# Patient Record
Sex: MALE | Employment: UNEMPLOYED | URBAN - METROPOLITAN AREA
[De-identification: names, ages, dates, MRNs, and addresses within clinical notes are randomized per-mention and may not be internally consistent; named-entity substitution may affect disease eponyms.]

---

## 2019-12-06 ENCOUNTER — HOSPITAL ENCOUNTER (INPATIENT)
Age: 49
LOS: 5 days | Discharge: HOME | DRG: 463 | End: 2019-12-11
Attending: EMERGENCY MEDICINE | Admitting: FAMILY MEDICINE
Payer: MEDICAID

## 2019-12-06 DIAGNOSIS — N12 PYELONEPHRITIS: Primary | ICD-10-CM

## 2019-12-06 DIAGNOSIS — N15.1 PERINEPHRIC ABSCESS: ICD-10-CM

## 2019-12-06 LAB
APPEARANCE UR: CLEAR
BACTERIA URNS QL MICRO: NEGATIVE /HPF
BILIRUB UR QL CFM: POSITIVE
COLOR UR: ABNORMAL
EPITH CASTS URNS QL MICRO: ABNORMAL /LPF
GLUCOSE UR STRIP.AUTO-MCNC: NEGATIVE MG/DL
HGB UR QL STRIP: NEGATIVE
KETONES UR QL STRIP.AUTO: 15 MG/DL
LEUKOCYTE ESTERASE UR QL STRIP.AUTO: NEGATIVE
NITRITE UR QL STRIP.AUTO: NEGATIVE
PH UR STRIP: 6 [PH] (ref 5–8)
PROT UR STRIP-MCNC: ABNORMAL MG/DL
RBC #/AREA URNS HPF: ABNORMAL /HPF (ref 0–5)
SP GR UR REFRACTOMETRY: 1.02 (ref 1–1.03)
UR CULT HOLD, URHOLD: NORMAL
UROBILINOGEN UR QL STRIP.AUTO: 1 EU/DL (ref 0.2–1)
WBC URNS QL MICRO: ABNORMAL /HPF (ref 0–4)

## 2019-12-06 PROCEDURE — 74011250636 HC RX REV CODE- 250/636: Performed by: FAMILY MEDICINE

## 2019-12-06 PROCEDURE — 99284 EMERGENCY DEPT VISIT MOD MDM: CPT

## 2019-12-06 PROCEDURE — 65660000001 HC RM ICU INTERMED STEPDOWN

## 2019-12-06 PROCEDURE — 74011250636 HC RX REV CODE- 250/636: Performed by: EMERGENCY MEDICINE

## 2019-12-06 PROCEDURE — 96374 THER/PROPH/DIAG INJ IV PUSH: CPT

## 2019-12-06 PROCEDURE — 74011000258 HC RX REV CODE- 258: Performed by: FAMILY MEDICINE

## 2019-12-06 PROCEDURE — 74011250637 HC RX REV CODE- 250/637: Performed by: FAMILY MEDICINE

## 2019-12-06 PROCEDURE — 87086 URINE CULTURE/COLONY COUNT: CPT

## 2019-12-06 PROCEDURE — 77030011943

## 2019-12-06 PROCEDURE — 81001 URINALYSIS AUTO W/SCOPE: CPT

## 2019-12-06 RX ORDER — ACETAMINOPHEN 325 MG/1
650 TABLET ORAL
Status: DISCONTINUED | OUTPATIENT
Start: 2019-12-06 | End: 2019-12-11 | Stop reason: HOSPADM

## 2019-12-06 RX ORDER — VANCOMYCIN HYDROCHLORIDE
1250 EVERY 8 HOURS
Status: DISCONTINUED | OUTPATIENT
Start: 2019-12-07 | End: 2019-12-09 | Stop reason: ALTCHOICE

## 2019-12-06 RX ORDER — SODIUM CHLORIDE 0.9 % (FLUSH) 0.9 %
5-40 SYRINGE (ML) INJECTION AS NEEDED
Status: DISCONTINUED | OUTPATIENT
Start: 2019-12-06 | End: 2019-12-11 | Stop reason: HOSPADM

## 2019-12-06 RX ORDER — MORPHINE SULFATE 2 MG/ML
4 INJECTION, SOLUTION INTRAMUSCULAR; INTRAVENOUS ONCE
Status: COMPLETED | OUTPATIENT
Start: 2019-12-06 | End: 2019-12-06

## 2019-12-06 RX ORDER — HEPARIN SODIUM 5000 [USP'U]/ML
5000 INJECTION, SOLUTION INTRAVENOUS; SUBCUTANEOUS EVERY 8 HOURS
Status: DISCONTINUED | OUTPATIENT
Start: 2019-12-06 | End: 2019-12-11 | Stop reason: HOSPADM

## 2019-12-06 RX ORDER — SODIUM CHLORIDE 9 MG/ML
100 INJECTION, SOLUTION INTRAVENOUS CONTINUOUS
Status: DISCONTINUED | OUTPATIENT
Start: 2019-12-06 | End: 2019-12-07 | Stop reason: ALTCHOICE

## 2019-12-06 RX ORDER — SODIUM CHLORIDE 0.9 % (FLUSH) 0.9 %
5-40 SYRINGE (ML) INJECTION EVERY 8 HOURS
Status: DISCONTINUED | OUTPATIENT
Start: 2019-12-06 | End: 2019-12-11 | Stop reason: HOSPADM

## 2019-12-06 RX ORDER — OXYCODONE AND ACETAMINOPHEN 5; 325 MG/1; MG/1
1 TABLET ORAL
Status: DISCONTINUED | OUTPATIENT
Start: 2019-12-06 | End: 2019-12-11 | Stop reason: HOSPADM

## 2019-12-06 RX ORDER — PIPERACILLIN SODIUM, TAZOBACTAM SODIUM 3; .375 G/15ML; G/15ML
3.38 INJECTION, POWDER, LYOPHILIZED, FOR SOLUTION INTRAVENOUS ONCE
COMMUNITY
End: 2019-12-11

## 2019-12-06 RX ORDER — VANCOMYCIN 2 GRAM/500 ML IN 0.9 % SODIUM CHLORIDE INTRAVENOUS
2000 ONCE
Status: DISCONTINUED | OUTPATIENT
Start: 2019-12-06 | End: 2019-12-06

## 2019-12-06 RX ADMIN — Medication 10 ML: at 21:55

## 2019-12-06 RX ADMIN — HEPARIN SODIUM 5000 UNITS: 5000 INJECTION INTRAVENOUS; SUBCUTANEOUS at 22:25

## 2019-12-06 RX ADMIN — MORPHINE SULFATE 4 MG: 2 INJECTION, SOLUTION INTRAMUSCULAR; INTRAVENOUS at 18:05

## 2019-12-06 RX ADMIN — PIPERACILLIN SODIUM AND TAZOBACTAM SODIUM 3.38 G: 3; .375 INJECTION, POWDER, LYOPHILIZED, FOR SOLUTION INTRAVENOUS at 20:54

## 2019-12-06 RX ADMIN — SODIUM CHLORIDE 100 ML/HR: 900 INJECTION, SOLUTION INTRAVENOUS at 21:56

## 2019-12-06 RX ADMIN — ACETAMINOPHEN 650 MG: 325 TABLET ORAL at 22:24

## 2019-12-06 NOTE — ED TRIAGE NOTES
Triage note: Pt arrives via EMS as transfer from [facility] to drain left kidney abscess. Pt receiving vancomycin on arrival. Hx of self cath.

## 2019-12-06 NOTE — PROGRESS NOTES
Radiology  CT from [facility] has now been loaded in [address] [facility] and is viewable. There is a not a small less than 4 cm fluid collection in the left posterior lateral perinephric space. It does not have a well formed wall typical of a formed abscess. Available clinical info reviewed. Considering the patients history it may represent a developing abscess. There is not hydronephrosis or other obstruction demonstrated. Recommend continue IV antibiotics and if not significantly improved tomorrow we can attempt drainage under CT guidance. Better to have 12-24 hours of antibiotics on board prior to accessing as it is not well formed.

## 2019-12-06 NOTE — ED PROVIDER NOTES
Patient is a 49-year-old male transferred from outside hospital for further management of left-sided pyelonephritis and perinephric abscess. White count elevated to 25, Vanco and Zosyn administered prior to transfer. Patient arrives nontoxic-appearing, alert and oriented answering all questions. Reporting pain of left flank. Chronically straight caths and follows up with Dr. Kitty Gtz of urology. Outside hospital discussed case with Dr. Kitty Gtz who recommended [facility] transfer for admission and IR drainage. Patient denies 2 weeks of pain, no fevers, nausea, vomiting, chills. Lactic acid at outside hospital 0.4. Per old records, patient is taking Cipro for 2 weeks for left flank pain. History reviewed. No pertinent past medical history.     Past Surgical History:   Procedure Laterality Date    HX ORTHOPAEDIC Left     ORIF foot    HX UROLOGICAL  2011    bladder         Family History:   Problem Relation Age of Onset    No Known Problems Mother     Cancer Father        Social History     Socioeconomic History    Marital status: SINGLE     Spouse name: Not on file    Number of children: Not on file    Years of education: Not on file    Highest education level: Not on file   Occupational History    Not on file   Social Needs    Financial resource strain: Not on file    Food insecurity:     Worry: Not on file     Inability: Not on file    Transportation needs:     Medical: Not on file     Non-medical: Not on file   Tobacco Use    Smoking status: Current Every Day Smoker     Packs/day: 1.00     Years: 20.00     Pack years: 20.00    Smokeless tobacco: Never Used   Substance and Sexual Activity    Alcohol use: No    Drug use: No    Sexual activity: Not on file   Lifestyle    Physical activity:     Days per week: Not on file     Minutes per session: Not on file    Stress: Not on file   Relationships    Social connections:     Talks on phone: Not on file     Gets together: Not on file Attends [employer] service: Not on file     Active member of club or organization: Not on file     Attends meetings of clubs or organizations: Not on file     Relationship status: Not on file    Intimate partner violence:     Fear of current or ex partner: Not on file     Emotionally abused: Not on file     Physically abused: Not on file     Forced sexual activity: Not on file   Other Topics Concern    Not on file   Social History Narrative    Not on file         ALLERGIES: Patient has no known allergies. Review of Systems   Constitutional: Negative for chills and fever. HENT: Negative for drooling and nosebleeds. Eyes: Negative for pain and itching. Respiratory: Negative for choking and stridor. Cardiovascular: Negative for leg swelling. Gastrointestinal: Negative for abdominal pain and rectal pain. Endocrine: Negative for heat intolerance and polyphagia. Genitourinary: Positive for flank pain. Negative for enuresis and genital sores. Musculoskeletal: Negative for arthralgias and joint swelling. Skin: Negative for color change. Allergic/Immunologic: Negative for immunocompromised state. Neurological: Negative for tremors and speech difficulty. Hematological: Negative for adenopathy. Psychiatric/Behavioral: Negative for dysphoric mood and sleep disturbance. Vitals:    12/06/19 1740   BP: 123/72   Pulse: 76   Resp: 16   Temp: 98.1 °F (36.7 °C)   SpO2: 98%            Physical Exam  Vitals signs and nursing note reviewed. Constitutional:       General: He is not in acute distress. Appearance: He is well-developed. He is not ill-appearing, toxic-appearing or diaphoretic. HENT:      Head: Normocephalic. Nose: Nose normal.   Eyes:      Conjunctiva/sclera: Conjunctivae normal.   Neck:      Musculoskeletal: Normal range of motion and neck supple. Cardiovascular:      Rate and Rhythm: Normal rate and regular rhythm. Heart sounds: Normal heart sounds.    Pulmonary: Effort: Pulmonary effort is normal. No respiratory distress. Breath sounds: Normal breath sounds. Abdominal:      General: There is no distension. Palpations: Abdomen is soft. Tenderness: There is tenderness. There is left CVA tenderness. Musculoskeletal: Normal range of motion. General: No deformity. Skin:     General: Skin is warm and dry. Neurological:      Mental Status: He is alert. Coordination: Coordination normal.   Psychiatric:         Behavior: Behavior normal.          MDM  Number of Diagnoses or Management Options  Perinephric abscess:   Pyelonephritis:   Diagnosis management comments: Transferred from outside hospital for admission for pyelonephritis and IR drainage of perinephric abscess. Discussed with IR on-call Dr. Holden Miller who is unaware of patient's arrival.  Images on disc with patient, and uploaded for IR to review. They will decide a procedure tonight or tomorrow morning. He remains nontoxic, HD stable during ED stay. Pain controlled. Admitted to the hospital service for further management. Procedures      Hospitalist Perfect Serve for Admission  6:04 PM    ED Room Number: ER29/29  Patient Name and age:  Martin Bajwa 52 y.o.  male  Working Diagnosis:   1. Pyelonephritis    2. Perinephric abscess      Readmission: no  Isolation Requirements:  no  Recommended Level of Care:  telemetry  Code Status:  Full Code  Department:[facility] Adult ED - 21   Other:  Pt transfer from [address] [place] [address] ER for pyelonephritis and perinephric abscess. abx prior to transfer. Pt well appearing. Discussed with IR - Dr Narciso Gomez on call. Will decide procedure tonight or tomorrow. Patient is being admitted to the hospital.  The results of their tests and reasons for their admission have been discussed with them and/or available family. They convey agreement and understanding for the need to be admitted and for their admission diagnosis.

## 2019-12-07 ENCOUNTER — APPOINTMENT (OUTPATIENT)
Dept: CT IMAGING | Age: 49
DRG: 463 | End: 2019-12-07
Attending: INTERNAL MEDICINE
Payer: MEDICAID

## 2019-12-07 LAB
ALBUMIN SERPL-MCNC: 2.6 G/DL (ref 3.5–5)
ALBUMIN/GLOB SERPL: 0.6 {RATIO} (ref 1.1–2.2)
ALP SERPL-CCNC: 84 U/L (ref 45–117)
ALT SERPL-CCNC: 19 U/L (ref 12–78)
ANION GAP SERPL CALC-SCNC: 6 MMOL/L (ref 5–15)
AST SERPL-CCNC: 9 U/L (ref 15–37)
BASOPHILS # BLD: 0.1 K/UL (ref 0–0.1)
BASOPHILS NFR BLD: 1 % (ref 0–1)
BILIRUB DIRECT SERPL-MCNC: 0.2 MG/DL (ref 0–0.2)
BILIRUB SERPL-MCNC: 0.6 MG/DL (ref 0.2–1)
BUN SERPL-MCNC: 11 MG/DL (ref 6–20)
BUN/CREAT SERPL: 14 (ref 12–20)
CALCIUM SERPL-MCNC: 8.6 MG/DL (ref 8.5–10.1)
CHLORIDE SERPL-SCNC: 108 MMOL/L (ref 97–108)
CO2 SERPL-SCNC: 24 MMOL/L (ref 21–32)
COMMENT, HOLDF: NORMAL
CREAT SERPL-MCNC: 0.76 MG/DL (ref 0.7–1.3)
DIFFERENTIAL METHOD BLD: ABNORMAL
EOSINOPHIL # BLD: 0.2 K/UL (ref 0–0.4)
EOSINOPHIL NFR BLD: 1 % (ref 0–7)
ERYTHROCYTE [DISTWIDTH] IN BLOOD BY AUTOMATED COUNT: 11.6 % (ref 11.5–14.5)
GLOBULIN SER CALC-MCNC: 4.1 G/DL (ref 2–4)
GLUCOSE SERPL-MCNC: 83 MG/DL (ref 65–100)
HCT VFR BLD AUTO: 39.3 % (ref 36.6–50.3)
HGB BLD-MCNC: 12.4 G/DL (ref 12.1–17)
IMM GRANULOCYTES # BLD AUTO: 0.2 K/UL (ref 0–0.04)
IMM GRANULOCYTES NFR BLD AUTO: 1 % (ref 0–0.5)
LYMPHOCYTES # BLD: 3 K/UL (ref 0.8–3.5)
LYMPHOCYTES NFR BLD: 14 % (ref 12–49)
MCH RBC QN AUTO: 28.8 PG (ref 26–34)
MCHC RBC AUTO-ENTMCNC: 31.6 G/DL (ref 30–36.5)
MCV RBC AUTO: 91.4 FL (ref 80–99)
MONOCYTES # BLD: 2 K/UL (ref 0–1)
MONOCYTES NFR BLD: 9 % (ref 5–13)
NEUTS SEG # BLD: 16.5 K/UL (ref 1.8–8)
NEUTS SEG NFR BLD: 74 % (ref 32–75)
NRBC # BLD: 0 K/UL (ref 0–0.01)
NRBC BLD-RTO: 0 PER 100 WBC
PLATELET # BLD AUTO: 489 K/UL (ref 150–400)
PMV BLD AUTO: 8.7 FL (ref 8.9–12.9)
POTASSIUM SERPL-SCNC: 3.8 MMOL/L (ref 3.5–5.1)
PROT SERPL-MCNC: 6.7 G/DL (ref 6.4–8.2)
RBC # BLD AUTO: 4.3 M/UL (ref 4.1–5.7)
SAMPLES BEING HELD,HOLD: NORMAL
SODIUM SERPL-SCNC: 138 MMOL/L (ref 136–145)
WBC # BLD AUTO: 22 K/UL (ref 4.1–11.1)

## 2019-12-07 PROCEDURE — 77030003666 HC NDL SPINAL BD -A

## 2019-12-07 PROCEDURE — 0T9130Z DRAINAGE OF LEFT KIDNEY WITH DRAINAGE DEVICE, PERCUTANEOUS APPROACH: ICD-10-PCS | Performed by: RADIOLOGY

## 2019-12-07 PROCEDURE — 99152 MOD SED SAME PHYS/QHP 5/>YRS: CPT

## 2019-12-07 PROCEDURE — 74011250637 HC RX REV CODE- 250/637: Performed by: FAMILY MEDICINE

## 2019-12-07 PROCEDURE — 85025 COMPLETE CBC W/AUTO DIFF WBC: CPT

## 2019-12-07 PROCEDURE — 80048 BASIC METABOLIC PNL TOTAL CA: CPT

## 2019-12-07 PROCEDURE — 87077 CULTURE AEROBIC IDENTIFY: CPT

## 2019-12-07 PROCEDURE — 74011250637 HC RX REV CODE- 250/637: Performed by: INTERNAL MEDICINE

## 2019-12-07 PROCEDURE — C1769 GUIDE WIRE: HCPCS

## 2019-12-07 PROCEDURE — 80076 HEPATIC FUNCTION PANEL: CPT

## 2019-12-07 PROCEDURE — 87186 SC STD MICRODIL/AGAR DIL: CPT

## 2019-12-07 PROCEDURE — 87040 BLOOD CULTURE FOR BACTERIA: CPT

## 2019-12-07 PROCEDURE — 77030003462 HC NDL BIOP BRST MDT -B

## 2019-12-07 PROCEDURE — 87147 CULTURE TYPE IMMUNOLOGIC: CPT

## 2019-12-07 PROCEDURE — 77030010546 HC BG URIN DRNG URES -B

## 2019-12-07 PROCEDURE — 36415 COLL VENOUS BLD VENIPUNCTURE: CPT

## 2019-12-07 PROCEDURE — 74011250636 HC RX REV CODE- 250/636: Performed by: FAMILY MEDICINE

## 2019-12-07 PROCEDURE — 65660000001 HC RM ICU INTERMED STEPDOWN

## 2019-12-07 PROCEDURE — 77030011229 HC DIL VESL COON COOK -A

## 2019-12-07 PROCEDURE — 77030011943

## 2019-12-07 PROCEDURE — 87205 SMEAR GRAM STAIN: CPT

## 2019-12-07 PROCEDURE — 74011000258 HC RX REV CODE- 258: Performed by: FAMILY MEDICINE

## 2019-12-07 PROCEDURE — 77030014115

## 2019-12-07 PROCEDURE — 74011250636 HC RX REV CODE- 250/636: Performed by: RADIOLOGY

## 2019-12-07 PROCEDURE — 87075 CULTR BACTERIA EXCEPT BLOOD: CPT

## 2019-12-07 RX ORDER — MIDAZOLAM HYDROCHLORIDE 1 MG/ML
5 INJECTION, SOLUTION INTRAMUSCULAR; INTRAVENOUS
Status: DISCONTINUED | OUTPATIENT
Start: 2019-12-07 | End: 2019-12-07 | Stop reason: ALTCHOICE

## 2019-12-07 RX ORDER — FENTANYL CITRATE 50 UG/ML
200 INJECTION, SOLUTION INTRAMUSCULAR; INTRAVENOUS
Status: DISCONTINUED | OUTPATIENT
Start: 2019-12-07 | End: 2019-12-07 | Stop reason: ALTCHOICE

## 2019-12-07 RX ORDER — SODIUM CHLORIDE 9 MG/ML
50 INJECTION, SOLUTION INTRAVENOUS CONTINUOUS
Status: DISCONTINUED | OUTPATIENT
Start: 2019-12-07 | End: 2019-12-07 | Stop reason: ALTCHOICE

## 2019-12-07 RX ADMIN — MIDAZOLAM 2 MG: 1 INJECTION INTRAMUSCULAR; INTRAVENOUS at 12:08

## 2019-12-07 RX ADMIN — HEPARIN SODIUM 5000 UNITS: 5000 INJECTION INTRAVENOUS; SUBCUTANEOUS at 05:15

## 2019-12-07 RX ADMIN — PIPERACILLIN AND TAZOBACTAM 3.38 G: 3; .375 INJECTION, POWDER, LYOPHILIZED, FOR SOLUTION INTRAVENOUS at 13:30

## 2019-12-07 RX ADMIN — OXYCODONE HYDROCHLORIDE AND ACETAMINOPHEN 1 TABLET: 5; 325 TABLET ORAL at 04:10

## 2019-12-07 RX ADMIN — MIDAZOLAM 1 MG: 1 INJECTION INTRAMUSCULAR; INTRAVENOUS at 12:13

## 2019-12-07 RX ADMIN — FENTANYL CITRATE 50 MCG: 50 INJECTION, SOLUTION INTRAMUSCULAR; INTRAVENOUS at 12:21

## 2019-12-07 RX ADMIN — OXYCODONE HYDROCHLORIDE AND ACETAMINOPHEN 1 TABLET: 5; 325 TABLET ORAL at 08:32

## 2019-12-07 RX ADMIN — OXYCODONE HYDROCHLORIDE AND ACETAMINOPHEN 1 TABLET: 5; 325 TABLET ORAL at 20:19

## 2019-12-07 RX ADMIN — FENTANYL CITRATE 50 MCG: 50 INJECTION, SOLUTION INTRAMUSCULAR; INTRAVENOUS at 12:13

## 2019-12-07 RX ADMIN — MIDAZOLAM 1 MG: 1 INJECTION INTRAMUSCULAR; INTRAVENOUS at 12:20

## 2019-12-07 RX ADMIN — PIPERACILLIN AND TAZOBACTAM 3.38 G: 3; .375 INJECTION, POWDER, LYOPHILIZED, FOR SOLUTION INTRAVENOUS at 05:15

## 2019-12-07 RX ADMIN — MIDAZOLAM 1 MG: 1 INJECTION INTRAMUSCULAR; INTRAVENOUS at 12:16

## 2019-12-07 RX ADMIN — FENTANYL CITRATE 50 MCG: 50 INJECTION, SOLUTION INTRAMUSCULAR; INTRAVENOUS at 12:16

## 2019-12-07 RX ADMIN — FENTANYL CITRATE 50 MCG: 50 INJECTION, SOLUTION INTRAMUSCULAR; INTRAVENOUS at 12:17

## 2019-12-07 RX ADMIN — HEPARIN SODIUM 5000 UNITS: 5000 INJECTION INTRAVENOUS; SUBCUTANEOUS at 22:11

## 2019-12-07 RX ADMIN — VANCOMYCIN HYDROCHLORIDE 1250 MG: 10 INJECTION, POWDER, LYOPHILIZED, FOR SOLUTION INTRAVENOUS at 00:17

## 2019-12-07 RX ADMIN — VANCOMYCIN HYDROCHLORIDE 1250 MG: 10 INJECTION, POWDER, LYOPHILIZED, FOR SOLUTION INTRAVENOUS at 16:11

## 2019-12-07 RX ADMIN — Medication 10 ML: at 13:34

## 2019-12-07 RX ADMIN — Medication 10 ML: at 05:14

## 2019-12-07 RX ADMIN — HEPARIN SODIUM 5000 UNITS: 5000 INJECTION INTRAVENOUS; SUBCUTANEOUS at 13:30

## 2019-12-07 RX ADMIN — PIPERACILLIN AND TAZOBACTAM 3.38 G: 3; .375 INJECTION, POWDER, LYOPHILIZED, FOR SOLUTION INTRAVENOUS at 20:21

## 2019-12-07 RX ADMIN — Medication 10 ML: at 22:11

## 2019-12-07 RX ADMIN — ACETAMINOPHEN 650 MG: 325 TABLET ORAL at 16:12

## 2019-12-07 RX ADMIN — VANCOMYCIN HYDROCHLORIDE 1250 MG: 10 INJECTION, POWDER, LYOPHILIZED, FOR SOLUTION INTRAVENOUS at 08:17

## 2019-12-07 NOTE — ROUTINE PROCESS
TRANSFER - OUT REPORT: 
 
Verbal report given to [address], RN(name) on Darian Wiggins  being transferred to [facility]) for routine progression of care Report consisted of patients Situation, Background, Assessment and  
Recommendations(SBAR). Information from the following report(s) SBAR, ED Summary, [facility] and Recent Results was reviewed with the receiving nurse. Lines:  
Peripheral IV 12/06/19 Left Antecubital (Active) Site Assessment Clean, dry, & intact 12/6/2019  5:58 PM  
Phlebitis Assessment 0 12/6/2019  5:58 PM  
Infiltration Assessment 0 12/6/2019  5:58 PM  
Dressing Status Clean, dry, & intact 12/6/2019  5:58 PM  
   
Peripheral IV 12/06/19 Right Antecubital (Active) Site Assessment Clean, dry, & intact 12/6/2019  5:59 PM  
Phlebitis Assessment 0 12/6/2019  5:59 PM  
Infiltration Assessment 0 12/6/2019  5:59 PM  
Dressing Status Clean, dry, & intact 12/6/2019  5:59 PM  
  
 
Opportunity for questions and clarification was provided. Patient transported with: 
 Monitor Tech

## 2019-12-07 NOTE — H&P
[facility]  HISTORY AND PHYSICAL    Name:  May Tierney  MR#:  [id number]  :  1970  ACCOUNT #:  [de-identified]  ADMIT DATE:  2019      CHIEF COMPLAINT:  Left flank pain. HISTORY OF PRESENT ILLNESS:  The patient is a 51-year-old male transferred from [facility] with the above-mentioned symptom. The patient reports that he has history of neurogenic bladder, he self-caths himself. About two weeks back, he started noticing some left flank pain. The patient reports the pain got much worse in the last three days and he presented to [facility].  The patient reports he follows up with Dr. Stoney Martinez from Urology. The patient was seen over at [facility] [place], had a CT done, there was a concern for a perinephric abscess, and the patient was sent to [facility] ER for further management and evaluation. The patient reports that he has not had any fever or chills, but does admit to some nausea. Denies any vomiting. The patient denies any headache, blurry vision, sore throat, trouble swallowing, trouble with speech, any chest pain, shortness of breath, cough. Does admit to chills, but denies any fever, denies any constipation, diarrhea, focal or generalized neurological weakness, recent travel, sick contacts, falls, injuries, hematemesis, melena, hemoptysis, hematuria, or any concerns or problems. PAST MEDICAL HISTORY:  See above. HOME MEDICATIONS:  Currently, the patient is on Dilaudid 2 mg every 4 hours as needed for pain. ALLERGIES:  NO KNOWN DRUG ALLERGIES. SOCIAL HISTORY:  Current everyday smoker. Denies alcohol use or IV drug abuse. Lives at home. FAMILY HISTORY:  Father had a history of some unknown cancer. REVIEW OF SYMPTOMS:  All systems were reviewed and found to be essentially negative except for the symptoms mentioned above.     PHYSICAL EXAMINATION:  VITAL SIGNS:  Temperature 98.1, pulse 76, respiratory rate 16, blood pressure 123/72, pulse oximetry 98% on room air. GENERAL:  Alert and oriented x3, awake, mildly distressed, pleasant male, appears to be stated age. HEENT:  Pupils equal and reactive to light. Dry mucous membranes. Tympanic membranes clear. NECK:  Supple. CHEST:  Clear to auscultation bilaterally. CORONARY:  S1, S2 heard. ABDOMEN:  Soft, nontender, nondistended. Bowel sounds are physiological.  EXTREMITIES:  No clubbing, no cyanosis, no edema. NEURO/PSYCH:  Pleasant mood and affect. Cranial nerves II-XII are grossly intact. Sensory grossly within normal limits. DTRs 2+ x4. Strength 5/5. SKIN:  Warm. LABORATORY DATA:  White count 12.4, hemoglobin 15.9, hematocrit 46.5, platelets 083. Urine shows a large amount of leukocyte esterase with 20-50 wbc's. Negative for bacteria. Sodium 140, potassium 3.8, chloride 107, bicarbonate 37, anion gap 6, glucose 89, BUN 12, creatinine 0.93, calcium 9.2. EKG shows normal sinus rhythm and normal EKG. Urine culture is pending. ASSESSMENT AND PLAN:  1. Pyelonephritis with developing perinephric abscess. Appreciate Interventional Radiology input. We will keep the patient n.p.o. after midnight, IV hydration, IV antibiotics. Blood cultures were drawn in the emergency room. Provide supportive care, pain control, and continue to monitor. Further intervention per hospital course. Reassess as needed. If persists, may consider further intervention and diagnostics. Urine culture will be sent. 2.  Tobacco abuse. The patient was counseled. 3.  Leukocytosis, likely secondary to pyelonephritis with developing perinephric abscess. We will trend. Continue to monitor. Further intervention per hospital course. 4.  Gastrointestinal and deep venous thrombosis prophylaxis. The patient will be on heparin.       Chiquis Joe MD      [id number]  D:  12/06/2019 18:57  T:  12/06/2019 21:18  JOB #:  [id number]

## 2019-12-07 NOTE — ED NOTES
Pt received IV Zosyn at [facility] at 1400 today. Spoke to pharmacist who says give next dose at 2000.

## 2019-12-07 NOTE — PROGRESS NOTES
Problem: General Medical Care Plan  Goal: *Optimal pain control at patient's stated goal  Outcome: Progressing Towards Goal  Note:   Spoke with Dr. Macho Winn about prn pain medication for pt's left flank pain. Orders received. Goal: *Skin integrity maintained  Outcome: Progressing Towards Goal  Note:   Primary Nurse Michal Ganser, RN and Nguyen Corbett RN performed a dual skin assessment on this patient, No impairment noted  Deonte score is 23    Goal: *Fluid volume balance  Outcome: Progressing Towards Goal  Note:   IV fluids started per order. Bedside shift change report given to Kelsey Ocasio RN (oncoming nurse) by Blanco Byers RN (offgoing nurse). Report included the following information SBAR, Kardex, ED Summary, Intake/Output, MAR, Accordion, Recent Results and Cardiac Rhythm NSR.

## 2019-12-07 NOTE — PROGRESS NOTES
Admission Medication Reconciliation:    Information obtained from:  Patient    Comments/Recommendations: Reviewed PTA medications and patient's allergies. 1.  Patient was taking no medications prior to arrival at [facility] other than an antibiotic for UTI, could not remember name but was taking BID. (?cipro)    2. Medication changes (since last review): Added  - Vancomycin 2500mg x1 administered @ [facility] 402  - Zosyn 3.375g x1 administered at [facility] @ 1355    Adjusted  - none    Removed  - hydromorphone       ¹[employer] pharmacy benefit data reflects medications filled and processed through the patient's insurance, however   this data does NOT capture whether the medication was picked up or is currently being taken by the patient. Allergies:  Patient has no known allergies. Significant PMH/Disease States: History reviewed. No pertinent past medical history. Chief Complaint for this Admission:    Chief Complaint   Patient presents with    Transfer Of Care     Prior to Admission Medications:   Prior to Admission Medications   Prescriptions Last Dose Informant Patient Reported? Taking?   piperacillin-tazobactam (ZOSYN) 3.375 gram injection 2019 at 1355  Yes Yes   Sig: 3.375 g by IntraVENous route once. vancomycin HCl (VANCOMYCIN IV) 2019 at 1420  Yes Yes   Si,500 mg PE by IntraVENous route once.       Facility-Administered Medications: None

## 2019-12-07 NOTE — ROUTINE PROCESS
TRANSFER - IN REPORT: 
 
Verbal report received from Ayla Elkins RN (name) on Esther Saini  being received from ED(unit) for routine progression of care Report consisted of patients Situation, Background, Assessment and  
Recommendations(SBAR). Information from the following report(s) SBAR, Kardex, ED Summary, Intake/Output, MAR, Accordion, Recent Results and Cardiac Rhythm NSR was reviewed with the receiving nurse. Opportunity for questions and clarification was provided. Assessment completed upon patients arrival to unit and care assumed.

## 2019-12-07 NOTE — PROGRESS NOTES
Pharmacist Note - Vancomycin Dosing    Consult provided for this 52 y.o. male for indication of left-sided pyelonephritis and perinephric abscess, possible sepsis  Antibiotic regimen: Vanc and Zosyn  Patient on vancomycin PTA? YES    Scr 0.77 (collected today at [facility])  Frequency of BMP: tomorrow am  Height: 182.9 cm  Weight: 88.5 kg  Est CrCl: >100 ml/min  Temp (24hrs), Av.1 °F (36.7 °C), Min:98.1 °F (36.7 °C), Max:98.1 °F (36.7 °C)    Cultures:  none    Goal trough = 15 - 20 mcg/mL    Therapy will be initiated with a maintenance dose of 1250 mg IV every 8 hours. Patient received a LD 2500 mg IV today prior to admission at [facility] @ 14:20. Will start maintenance dose at midnight. Pharmacy to follow patient daily and order levels / make dose adjustments as appropriate.

## 2019-12-07 NOTE — PROGRESS NOTES
TRANSFER - OUT REPORT:    Verbal report given to ALEJANDRINA Arreola on Darian Wiggins  being transferred to [phone] for routine progression of care       Report consisted of patients Situation, Background, Assessment and   Recommendations(SBAR). Information from the following report(s) SBAR and Procedure Summary was reviewed with the receiving nurse. Lines:   Peripheral IV 12/06/19 Left Antecubital (Active)   Site Assessment Clean, dry, & intact 12/7/2019  8:30 AM   Phlebitis Assessment 0 12/7/2019  8:30 AM   Infiltration Assessment 0 12/7/2019  8:30 AM   Dressing Status Clean, dry, & intact 12/7/2019  8:30 AM   Dressing Type Tape;Transparent 12/7/2019  8:30 AM   Hub Color/Line Status Green;Capped 12/7/2019  8:30 AM   Action Taken Open ports on tubing capped 12/7/2019  8:30 AM   Alcohol Cap Used Yes 12/7/2019  8:30 AM       Peripheral IV 12/06/19 Right Antecubital (Active)   Site Assessment Clean, dry, & intact 12/7/2019  8:30 AM   Phlebitis Assessment 0 12/7/2019  8:30 AM   Infiltration Assessment 0 12/7/2019  8:30 AM   Dressing Status Clean, dry, & intact 12/7/2019  8:30 AM   Dressing Type Tape;Transparent 12/7/2019  8:30 AM   Hub Color/Line Status Green;Capped 12/7/2019  8:30 AM   Action Taken Open ports on tubing capped 12/7/2019  8:30 AM   Alcohol Cap Used Yes 12/7/2019  8:30 AM        Opportunity for questions and clarification was provided.       Patient transported with:   tech, O2

## 2019-12-07 NOTE — PROGRESS NOTES
[facility] Adult  Hospitalist Group                                                                                          Hospitalist Progress Note  Jennifer Streeter MD  Answering service: [phone] from in house phone        Date of Service:  2019  NAME:  Des Collins  :  1970  MRN:  [id number]      Admission Summary:   The patient is a 22-year-old male transferred from [facility] with the above-mentioned symptom. The patient reports that he has history of neurogenic bladder, he self-caths himself. About two weeks back, he started noticing some left flank pain. The patient reports the pain got much worse in the last three days and he presented to [facility].  The patient reports he follows up with Dr. Ryan Abdi from Urology. The patient was seen over at [facility] AT [place], had a CT done, there was a concern for a perinephric abscess, and the patient was sent to [facility] ER for further management and evaluation. The patient reports that he has not had any fever or chills, but does admit to some nausea. Denies any vomiting. The patient denies any headache, blurry vision, sore throat, trouble swallowing, trouble with speech, any chest pain, shortness of breath, cough. Does admit to chills, but denies any fever, denies any constipation, diarrhea, focal or generalized neurological weakness, recent travel, sick contacts, falls, injuries, hematemesis, melena, hemoptysis, hematuria, or any concerns or problems. Interval history / Subjective:   Overnight events noted. Vitals stable. No fevers. Continue to have the same pain in the left flank. Altamese Chars to know the next step and whether he is going to need any drainage. Assessment & Plan:     1.  L Pyelonephritis with developing left posterolateral perinephric abscess. Not septic per SIRS criteria. Only leukocytosis on admission  - D/w IR, Dr. Dotson Presume.  With increased leukocytosis IR will proceed with CT guided drainage today. Pus culture from IR to be sent.  -Cont NPO for now, IV hydration, IV antibiotics-Zosyn and Vanc empirically.   -Note suggests to have Blood cultures drawn in the ED. Will collect now, if not able to verify the collection in ED and sent to micro lab. -Continue supportive care, pain control, and monitor closely.    -Follow Urine culture and blood cultures.  -Will get Urology consultation - Dr. Luis A Cano is the urologist    # Neurogenic bladder, self cath dependent  -In and out cath as needed with I/O's monitoring    # Tobacco abuse.    -Patient counseled. # Leukocytosis, secondary to pyelonephritis with developing perinephric abscess.    -Uptrended. IR guided abscess drainage today    # Gastrointestinal and deep venous thrombosis prophylaxis. The patient will be on heparin. Code status: Full code  DVT prophylaxis: Heparin, hold for the procedure as needed - per IR    Care Plan discussed with: Patient/Family and Nurse  Disposition: Home w/Family and TBD     Hospital Problems  Never Reviewed          Codes Class Noted POA    Pyelonephritis ICD-10-CM: N12  ICD-9-CM: 590.80  12/6/2019 Unknown                Review of Systems:   A comprehensive review of systems was negative except for that written in the HPI. Vital Signs:    Last 24hrs VS reviewed since prior progress note. Most recent are:  Visit Vitals  /78 (BP 1 Location: Right arm, BP Patient Position: At rest)   Pulse 68   Temp 97.8 °F (36.6 °C)   Resp 22   Ht 6' (1.829 m)   Wt 87.1 kg (192 lb 0.4 oz)   SpO2 99%   BMI 26.04 kg/m²         Intake/Output Summary (Last 24 hours) at 12/7/2019 5233  Last data filed at 12/7/2019 0830  Gross per 24 hour   Intake 756.67 ml   Output 925 ml   Net -168.33 ml        Physical Examination:             Constitutional:  No acute distress, cooperative, pleasant    ENT:  Oral mucous moist, oropharynx benign. Resp:  CTA bilaterally. No wheezing/rhonchi/rales.  No accessory muscle use CV:  Regular rhythm, normal rate, no murmurs, gallops, rubs    GI:  Soft, non distended, mild tenderness left upper quadrant and lateral left flank subcostally . normoactive bowel sounds, no hepatosplenomegaly     Musculoskeletal:  No edema, warm, 2+ pulses throughout    Neurologic:  Moves all extremities. AAOx3, CN II-XII reviewed     Skin:  Good turgor, no rashes or ulcers  :  left flank and LUQ tenderness. No guarding/ rigidity. No CVA tendernes bilaterally  Eyes:  EOMI. Anicteric sclerae, PERRL. Data Review:   Review and/or order of clinical lab test  Review and/or order of tests in the radiology section of CPT  Review and/or order of tests in the medicine section of CPT      Labs:     Recent Labs     12/07/19  0401   WBC 22.0*   HGB 12.4   HCT 39.3   *     Recent Labs     12/07/19  0401      K 3.8      CO2 24   BUN 11   CREA 0.76   GLU 83   CA 8.6     No results for input(s): SGOT, GPT, ALT, AP, TBIL, TBILI, TP, ALB, GLOB, GGT, AML, LPSE in the last 72 hours. No lab exists for component: AMYP, HLPSE  No results for input(s): INR, PTP, APTT, INREXT in the last 72 hours. No results for input(s): FE, TIBC, PSAT, FERR in the last 72 hours. No results found for: FOL, RBCF   No results for input(s): PH, PCO2, PO2 in the last 72 hours. No results for input(s): CPK, CKNDX, TROIQ in the last 72 hours.     No lab exists for component: CPKMB  No results found for: CHOL, CHOLX, CHLST, CHOLV, HDL, HDLP, LDL, LDLC, DLDLP, TGLX, TRIGL, TRIGP, CHHD, CHHDX  No results found for: [facility]  Lab Results   Component Value Date/Time    Color DARK YELLOW 12/06/2019 08:39 PM    Appearance CLEAR 12/06/2019 08:39 PM    Specific gravity 1.020 12/06/2019 08:39 PM    Specific gravity 1.010 01/08/2016 06:38 AM    pH (UA) 6.0 12/06/2019 08:39 PM    Protein TRACE (A) 12/06/2019 08:39 PM    Glucose NEGATIVE  12/06/2019 08:39 PM    Ketone 15 (A) 12/06/2019 08:39 PM    Bilirubin NEGATIVE  01/08/2016 06:38 AM Urobilinogen 1.0 12/06/2019 08:39 PM    Nitrites NEGATIVE  12/06/2019 08:39 PM    Leukocyte Esterase NEGATIVE  12/06/2019 08:39 PM    Epithelial cells FEW 12/06/2019 08:39 PM    Bacteria NEGATIVE  12/06/2019 08:39 PM    WBC 0-4 12/06/2019 08:39 PM    RBC 0-5 12/06/2019 08:39 PM         Medications Reviewed:     Current Facility-Administered Medications   Medication Dose Route Frequency    sodium chloride (NS) flush 5-40 mL  5-40 mL IntraVENous Q8H    sodium chloride (NS) flush 5-40 mL  5-40 mL IntraVENous PRN    0.9% sodium chloride infusion  100 mL/hr IntraVENous CONTINUOUS    acetaminophen (TYLENOL) tablet 650 mg  650 mg Oral Q4H PRN    heparin (porcine) injection 5,000 Units  5,000 Units SubCUTAneous Q8H    piperacillin-tazobactam (ZOSYN) 3.375 g in 0.9% sodium chloride (MBP/ADV) 100 mL  3.375 g IntraVENous Q8H    vancomycin dosing by pharmacy   Other Rx Dosing/Monitoring    vancomycin (VANCOCIN) 1250 mg in  ml infusion  1,250 mg IntraVENous Q8H    oxyCODONE-acetaminophen (PERCOCET) 5-325 mg per tablet 1 Tab  1 Tab Oral Q4H PRN     ______________________________________________________________________  EXPECTED LENGTH OF STAY: - - -  ACTUAL LENGTH OF STAY:          1                 Elroy Holm MD

## 2019-12-08 LAB
ALBUMIN SERPL-MCNC: 2.6 G/DL (ref 3.5–5)
ALBUMIN/GLOB SERPL: 0.6 {RATIO} (ref 1.1–2.2)
ALP SERPL-CCNC: 77 U/L (ref 45–117)
ALT SERPL-CCNC: 21 U/L (ref 12–78)
ANION GAP SERPL CALC-SCNC: 6 MMOL/L (ref 5–15)
AST SERPL-CCNC: 14 U/L (ref 15–37)
BACTERIA SPEC CULT: NORMAL
BASOPHILS # BLD: 0.1 K/UL (ref 0–0.1)
BASOPHILS NFR BLD: 1 % (ref 0–1)
BILIRUB SERPL-MCNC: 0.5 MG/DL (ref 0.2–1)
BUN SERPL-MCNC: 6 MG/DL (ref 6–20)
BUN/CREAT SERPL: 7 (ref 12–20)
CALCIUM SERPL-MCNC: 9 MG/DL (ref 8.5–10.1)
CC UR VC: NORMAL
CHLORIDE SERPL-SCNC: 107 MMOL/L (ref 97–108)
CO2 SERPL-SCNC: 26 MMOL/L (ref 21–32)
CREAT SERPL-MCNC: 0.86 MG/DL (ref 0.7–1.3)
DATE LAST DOSE: ABNORMAL
DIFFERENTIAL METHOD BLD: ABNORMAL
EOSINOPHIL # BLD: 0.1 K/UL (ref 0–0.4)
EOSINOPHIL NFR BLD: 1 % (ref 0–7)
ERYTHROCYTE [DISTWIDTH] IN BLOOD BY AUTOMATED COUNT: 11.3 % (ref 11.5–14.5)
GLOBULIN SER CALC-MCNC: 4.4 G/DL (ref 2–4)
GLUCOSE SERPL-MCNC: 107 MG/DL (ref 65–100)
HCT VFR BLD AUTO: 41.2 % (ref 36.6–50.3)
HGB BLD-MCNC: 13.2 G/DL (ref 12.1–17)
IMM GRANULOCYTES # BLD AUTO: 0.1 K/UL (ref 0–0.04)
IMM GRANULOCYTES NFR BLD AUTO: 1 % (ref 0–0.5)
LYMPHOCYTES # BLD: 2.6 K/UL (ref 0.8–3.5)
LYMPHOCYTES NFR BLD: 14 % (ref 12–49)
MCH RBC QN AUTO: 28.9 PG (ref 26–34)
MCHC RBC AUTO-ENTMCNC: 32 G/DL (ref 30–36.5)
MCV RBC AUTO: 90.2 FL (ref 80–99)
MONOCYTES # BLD: 1.6 K/UL (ref 0–1)
MONOCYTES NFR BLD: 9 % (ref 5–13)
NEUTS SEG # BLD: 14.4 K/UL (ref 1.8–8)
NEUTS SEG NFR BLD: 74 % (ref 32–75)
NRBC # BLD: 0 K/UL (ref 0–0.01)
NRBC BLD-RTO: 0 PER 100 WBC
PLATELET # BLD AUTO: 514 K/UL (ref 150–400)
PMV BLD AUTO: 8.6 FL (ref 8.9–12.9)
POTASSIUM SERPL-SCNC: 3.6 MMOL/L (ref 3.5–5.1)
PROT SERPL-MCNC: 7 G/DL (ref 6.4–8.2)
RBC # BLD AUTO: 4.57 M/UL (ref 4.1–5.7)
REPORTED DOSE,DOSE: ABNORMAL UNITS
REPORTED DOSE/TIME,TMG: ABNORMAL
SERVICE CMNT-IMP: NORMAL
SODIUM SERPL-SCNC: 139 MMOL/L (ref 136–145)
VANCOMYCIN TROUGH SERPL-MCNC: 16.2 UG/ML (ref 5–10)
WBC # BLD AUTO: 18.9 K/UL (ref 4.1–11.1)

## 2019-12-08 PROCEDURE — 36415 COLL VENOUS BLD VENIPUNCTURE: CPT

## 2019-12-08 PROCEDURE — 80053 COMPREHEN METABOLIC PANEL: CPT

## 2019-12-08 PROCEDURE — 74011250637 HC RX REV CODE- 250/637: Performed by: INTERNAL MEDICINE

## 2019-12-08 PROCEDURE — 65660000001 HC RM ICU INTERMED STEPDOWN

## 2019-12-08 PROCEDURE — 74011250636 HC RX REV CODE- 250/636: Performed by: FAMILY MEDICINE

## 2019-12-08 PROCEDURE — 85025 COMPLETE CBC W/AUTO DIFF WBC: CPT

## 2019-12-08 PROCEDURE — 77030011943

## 2019-12-08 PROCEDURE — 80202 ASSAY OF VANCOMYCIN: CPT

## 2019-12-08 PROCEDURE — 74011000258 HC RX REV CODE- 258: Performed by: FAMILY MEDICINE

## 2019-12-08 RX ADMIN — PIPERACILLIN AND TAZOBACTAM 3.38 G: 3; .375 INJECTION, POWDER, LYOPHILIZED, FOR SOLUTION INTRAVENOUS at 20:48

## 2019-12-08 RX ADMIN — OXYCODONE HYDROCHLORIDE AND ACETAMINOPHEN 1 TABLET: 5; 325 TABLET ORAL at 15:45

## 2019-12-08 RX ADMIN — HEPARIN SODIUM 5000 UNITS: 5000 INJECTION INTRAVENOUS; SUBCUTANEOUS at 22:57

## 2019-12-08 RX ADMIN — Medication 10 ML: at 05:15

## 2019-12-08 RX ADMIN — OXYCODONE HYDROCHLORIDE AND ACETAMINOPHEN 1 TABLET: 5; 325 TABLET ORAL at 05:23

## 2019-12-08 RX ADMIN — VANCOMYCIN HYDROCHLORIDE 1250 MG: 10 INJECTION, POWDER, LYOPHILIZED, FOR SOLUTION INTRAVENOUS at 08:09

## 2019-12-08 RX ADMIN — OXYCODONE HYDROCHLORIDE AND ACETAMINOPHEN 1 TABLET: 5; 325 TABLET ORAL at 00:16

## 2019-12-08 RX ADMIN — Medication 10 ML: at 14:32

## 2019-12-08 RX ADMIN — Medication 10 ML: at 22:57

## 2019-12-08 RX ADMIN — PIPERACILLIN AND TAZOBACTAM 3.38 G: 3; .375 INJECTION, POWDER, LYOPHILIZED, FOR SOLUTION INTRAVENOUS at 05:16

## 2019-12-08 RX ADMIN — HEPARIN SODIUM 5000 UNITS: 5000 INJECTION INTRAVENOUS; SUBCUTANEOUS at 05:15

## 2019-12-08 RX ADMIN — VANCOMYCIN HYDROCHLORIDE 1250 MG: 10 INJECTION, POWDER, LYOPHILIZED, FOR SOLUTION INTRAVENOUS at 18:25

## 2019-12-08 RX ADMIN — HEPARIN SODIUM 5000 UNITS: 5000 INJECTION INTRAVENOUS; SUBCUTANEOUS at 14:31

## 2019-12-08 RX ADMIN — OXYCODONE HYDROCHLORIDE AND ACETAMINOPHEN 1 TABLET: 5; 325 TABLET ORAL at 20:45

## 2019-12-08 RX ADMIN — VANCOMYCIN HYDROCHLORIDE 1250 MG: 10 INJECTION, POWDER, LYOPHILIZED, FOR SOLUTION INTRAVENOUS at 00:34

## 2019-12-08 RX ADMIN — PIPERACILLIN AND TAZOBACTAM 3.38 G: 3; .375 INJECTION, POWDER, LYOPHILIZED, FOR SOLUTION INTRAVENOUS at 14:31

## 2019-12-08 RX ADMIN — OXYCODONE HYDROCHLORIDE AND ACETAMINOPHEN 1 TABLET: 5; 325 TABLET ORAL at 11:01

## 2019-12-08 NOTE — PROGRESS NOTES
Bedside shift change report given to Lurdes Constantino RN (oncoming nurse) by Lucilla Mcardle, RN (offgoing nurse). Report included the following information SBAR, Kardex, Intake/Output, MAR, Recent Results and Cardiac Rhythm NSR. Problem: General Medical Care Plan  Goal: *Vital signs within specified parameters  Outcome: Progressing Towards Goal  Goal: *Optimal pain control at patient's stated goal  Outcome: Progressing Towards Goal  Prn pain medication given. See MAR  Goal: *Optimize nutritional status  Outcome: Progressing Towards Goal   pt on regular diet. Did not eat much of dinner- sleepy post procedure.    Problem: General Infection Care Plan (Adult and Pediatric)  Goal: Improvement in signs and symptoms of infection  Outcome: Progressing Towards Goal

## 2019-12-08 NOTE — PROGRESS NOTES
Problem: Pain  Goal: *Control of Pain  Outcome: Progressing Towards Goal  Percocet and tylenol as needed, pain scale explained      Problem: General Infection Care Plan (Adult and Pediatric)  Goal: Improvement in signs and symptoms of infection  Outcome: Progressing Towards Goal  Blood cultures, daily BMP< CBC, vital signs q4h. Zosyn and vancomycin IV   Goal: *Optimize nutritional status  Outcome: Progressing Towards Goal    Regular diet, consumes 50%    2005-Bedside shift change report given to Zay Hudson  (oncoming nurse) by Abdirahman Chery RN  (offgoing nurse). Report included the following information SBAR, Intake/Output, MAR, Recent Results and Cardiac Rhythm NSR.

## 2019-12-08 NOTE — PROGRESS NOTES
Pharmacist Note - Vancomycin Dosing  Therapy day 2  Indication: left-sided pyelonephritis and perinephric abscess, possible sepsis  Current regimen: 1250 mg IV q 8 h    A Trough Level resulted at 16.2 mcg/mL which was obtained 8 hrs post-dose. Goal trough: 15 - 20 mcg/mL     Plan: Continue current regimen. Pharmacy will continue to monitor this patient daily for changes in clinical status and renal function.

## 2019-12-08 NOTE — PROGRESS NOTES
Spiritual Care Partner Volunteer visited patient in Rm 402 on 12/8/19. Documented by:   Chaplain Perez MDiv, MACE  [phone] [phone])

## 2019-12-08 NOTE — PROGRESS NOTES
Letter of Status Determination: Current Status   INPATIENT is Appropriate         Pt Name:  Fernando Barrios   MR#  [id number]   [facility]#   [id number]   Room and Hospital  402/01  @ [facility]   Hospitalization date  12/6/2019  5:28 PM   Current Attending Physician  Mariah Duckworth MD   Principal diagnosis  Perinephric abscess   Clinicals  52 y.o. y.o  male hospitalized with     The patient is a 70-year-old male transferred from [facility] with the above-mentioned symptom. The patient reports that he has history of neurogenic bladder, he self-caths himself. About two weeks back, he started noticing some left flank pain. The patient reports the pain got much worse in the last three days and he presented to [facility].  The patient reports he follows up with Dr. Emeterio Cleveland from Urology.       [facility] criteria   Does  apply S/P CT GUIDED PERINEPHRIC ABSCESS DRAINAGE    STATUS DETERMINATION  On the basis of clinical data, available documentaion, we believe that the current status of this patient as INPATIENT is Appropriate     The final decision of the patient's hospitalization status depends on the attending physician's judgment    Additional comments     Insurance  Payor: SELF PAY / Plan: Mesha 63 / Product Type: Self Pay /    Insurance Information                SELF PAY/BSHSI SELF PAY Phone: [phone]    Subscriber: Rachel Abel Subscriber#: [id number]    Group#:  Precert#:                    Kriss Ennis MD  Cell: [id number]  Physician Advisor                             12/8/2019 [id number] INPATIENT 402/01 [facility] Fernando Barrios Payor: SELF PAY / Plan: BSHSI SELF PAY / Product Type: Self Pay /    Status Appropriate

## 2019-12-08 NOTE — CONSULTS
Urology Consult    Subjective:     Date of Consultation:  December 8, 2019    Referring Physician: Elias Ambriz    Reason for Consultation:  Perinephric abscess    Patient Name: Paradise Ramos  MRN: [id number]    History of Present Illness: The patient is a 51-year-old male transferred from [facility] with the above-mentioned symptom. The patient reports that he has history of neurogenic bladder, he self-caths himself. About two weeks back, he started noticing some left flank pain. The patient reports the pain got much worse in the last three days and he presented to [facility].  The patient reports he follows up with Dr. Nayla Villa from Urology. The patient was seen over at [facility] AT [place], had a CT done, there was a concern for a perinephric abscess, and the patient was sent to [facility] ER for further management and evaluation. The patient reports that he has not had any fever or chills, but does admit to some nausea. Denies any vomiting. The patient denies any headache, blurry vision, sore throat, trouble swallowing, trouble with speech, any chest pain, shortness of breath, cough. Does admit to chills, but denies any fever, denies any constipation, diarrhea, focal or generalized neurological weakness, recent travel, sick contacts, falls, injuries, hematemesis, melena, hemoptysis, hematuria, or any concerns or problems. Underwent CT guided drainage yesterday. Feeling much better. Catheterizing without difficulty. No hematuria     History reviewed. No pertinent past medical history.    Past Surgical History:   Procedure Laterality Date    HX ORTHOPAEDIC Left     ORIF foot    HX UROLOGICAL  2011    bladder      Family History   Problem Relation Age of Onset    No Known Problems Mother     Cancer Father       Social History     Tobacco Use    Smoking status: Current Every Day Smoker     Packs/day: 1.00     Years: 20.00     Pack years: 20.00    Smokeless tobacco: Never Used Substance Use Topics    Alcohol use: No     No Known Allergies   Prior to Admission medications    Medication Sig Start Date End Date Taking? Authorizing Provider   vancomycin HCl (VANCOMYCIN IV) 2,500 mg PE by IntraVENous route once. Yes Provider, Historical   piperacillin-tazobactam (ZOSYN) 3.375 gram injection 3.375 g by IntraVENous route once. Yes Provider, Historical         Review of Systems:  A comprehensive review of systems was negative except for that written in the HPI. Objective:     Data Review (Labs):    Recent Labs     19  0019 19  0401   WBC 18.9* 22.0*   HGB 13.2 12.4   MCV 90.2 91.4   * 489*    138   K 3.6 3.8   CREA 0.86 0.76   BUN 6 11   ALB 2.6* 2.6*   TBILI 0.5 0.6   SGOT 14* 9*   ALT 21 19   AP 77 84   IPVITALS(8:)Temp (24hrs), Av.1 °F (36.7 °C), Min:97.7 °F (36.5 °C), Max:99 °F (37.2 °C)    Temp (24hrs), Av.1 °F (36.7 °C), Min:97.7 °F (36.5 °C), Max:99 °F (37.2 °C)  [id number] 1901 -  0700  In: 2306.7 [P.O.:500; I.V.:1806.7]  Out: 2805 [Urine:2775; Drains:30]    Physical Exam:            General:    alert, cooperative, no distress, appears stated age                     Skin:  Normal.   Lymph nodes:  Cervical, supraclavicular, and axillary nodes normal.             Abdomen[de-identified]  soft, non-tender.  Bowel sounds normal. No masses,  no organomegaly             Genitalia:  defer exam          Extremities:  negative       Assessment:     Active Problems:    Pyelonephritis (2019)          Perinephric abscess s/p perc drainage  Neurogenic bladder      Plan:     Follow cultures- adjust abx as necessary  Self catheterization per routine  Repeat CT in 24-48 hours to confirm complete drainage prior to removal of drain    Signed By: Jose Lopez MD                         2019

## 2019-12-08 NOTE — PROGRESS NOTES
[facility]  Hospitalist Group                                                                                          Hospitalist Progress Note  Sohail Dykes MD  Answering service: [phone] from in house phone        Date of Service:  2019  NAME:  Alysa Guzman  :  1970  MRN:  [id number]      Admission Summary:   The patient is a 22-year-old male transferred from [facility] with the above-mentioned symptom. The patient reports that he has history of neurogenic bladder, he self-caths himself. About two weeks back, he started noticing some left flank pain. The patient reports the pain got much worse in the last three days and he presented to [facility].  The patient reports he follows up with Dr. Radhika Norman from Urology. The patient was seen over at [facility] AT [place], had a CT done, there was a concern for a perinephric abscess, and the patient was sent to [address] [place] [address] ER for further management and evaluation. The patient reports that he has not had any fever or chills, but does admit to some nausea. Denies any vomiting. The patient denies any headache, blurry vision, sore throat, trouble swallowing, trouble with speech, any chest pain, shortness of breath, cough. Does admit to chills, but denies any fever, denies any constipation, diarrhea, focal or generalized neurological weakness, recent travel, sick contacts, falls, injuries, hematemesis, melena, hemoptysis, hematuria, or any concerns or problems. Interval history / Subjective:   Patient followed up for left posterolateral perinephric abscess. Status post CT-guided IR drainage of the abscess with drain placement done on 2019. Patient is feeling much better. Left flank and upper abdominal pain better. No nausea/vomiting/diarrhea. No fever/chills. Asked about drain and future plan for that. No other concerns.      Assessment & Plan:     1.  L Pyelonephritis with developing left posterolateral perinephric abscess. Status post CT-guided IR drainage of perinephric abscess on 12/7/2019  -15 mL pus removed, soft drain catheter in place  -Appreciate IR, Dr. Angelique Woodruff.   -Continue IV antibiotics-Zosyn and Vanc empirically. Adjust based on culture results.  -Continue supportive care, pain control, and monitor closely.    -Urology, Dr. Jerrell Jones, on board. Appreciate recommendation for repeat CT in 24-48 hours to confirm complete drainage prior to drain removal.  Dr. Emeterio Cleveland is the primary urologist.    # Neurogenic bladder, self cath dependent  -In and out cath as needed with I/O's monitoring. # Tobacco abuse.    -Patient counseled. # Leukocytosis, secondary to pyelonephritis with developing perinephric abscess.    -Improving. We will continue to monitor closely. Code status: Full code  DVT prophylaxis: Heparin    Care Plan discussed with: Patient/Family and Nurse  Disposition: Home w/Family and TBD     Hospital Problems  Never Reviewed          Codes Class Noted POA    Pyelonephritis ICD-10-CM: N12  ICD-9-CM: 590.80  12/6/2019 Unknown                Review of Systems:   A comprehensive review of systems was negative except for that written in the HPI. Vital Signs:    Last 24hrs VS reviewed since prior progress note. Most recent are:  Visit Vitals  /76 (BP 1 Location: Right arm, BP Patient Position: At rest)   Pulse 67   Temp 98 °F (36.7 °C)   Resp 22   Ht 6' (1.829 m)   Wt 86.9 kg (191 lb 8 oz)   SpO2 97%   BMI 25.97 kg/m²         Intake/Output Summary (Last 24 hours) at 12/8/2019 1123  Last data filed at 12/8/2019 1057  Gross per 24 hour   Intake 1550 ml   Output 2680 ml   Net -1130 ml        Physical Examination:             Constitutional:  No acute distress, cooperative, pleasant    ENT:  Oral mucous moist, oropharynx benign. Resp:  CTA bilaterally. No wheezing/rhonchi/rales.  No accessory muscle use   CV:  Regular rhythm, normal rate, no murmurs, gallops, rubs    GI:  Soft, non distended, mild tenderness left upper quadrant and lateral left flank subcostally . normoactive bowel sounds, no hepatosplenomegaly     Musculoskeletal:  No edema, warm, 2+ pulses throughout    Neurologic:  Moves all extremities. AAOx3, CN II-XII reviewed     Skin:  Good turgor, no rashes or ulcers  :  Tenderness resolved. Drain in left flank in place with ~10ml serosanguinous discharge in bag. No CVA tenderness  Eyes:  EOMI. Anicteric sclerae, PERRL. Data Review:   Review and/or order of clinical lab test  Review and/or order of tests in the radiology section of CPT  Review and/or order of tests in the medicine section of [facility]      Labs:     Recent Labs     12/08/19 0019 12/07/19  0401   WBC 18.9* 22.0*   HGB 13.2 12.4   HCT 41.2 39.3   * 489*     Recent Labs     12/08/19 0019 12/07/19  0401    138   K 3.6 3.8    108   CO2 26 24   BUN 6 11   CREA 0.86 0.76   * 83   CA 9.0 8.6     Recent Labs     12/08/19 0019 12/07/19  0401   SGOT 14* 9*   ALT 21 19   AP 77 84   TBILI 0.5 0.6   TP 7.0 6.7   ALB 2.6* 2.6*   GLOB 4.4* 4.1*     No results for input(s): INR, PTP, APTT, INREXT, INREXT in the last 72 hours. No results for input(s): FE, TIBC, PSAT, FERR in the last 72 hours. No results found for: FOL, RBCF   No results for input(s): PH, PCO2, PO2 in the last 72 hours. No results for input(s): CPK, CKNDX, TROIQ in the last 72 hours.     No lab exists for component: CPKMB  No results found for: CHOL, CHOLX, CHLST, CHOLV, HDL, HDLP, LDL, LDLC, DLDLP, TGLX, TRIGL, TRIGP, CHHD, CHHDX  No results found for: [facility]  Lab Results   Component Value Date/Time    Color DARK YELLOW 12/06/2019 08:39 PM    Appearance CLEAR 12/06/2019 08:39 PM    Specific gravity 1.020 12/06/2019 08:39 PM    Specific gravity 1.010 01/08/2016 06:38 AM    pH (UA) 6.0 12/06/2019 08:39 PM    Protein TRACE (A) 12/06/2019 08:39 PM    Glucose NEGATIVE  12/06/2019 08:39 PM    Ketone 15 (A) 12/06/2019 08:39 PM    Bilirubin NEGATIVE  01/08/2016 06:38 AM    Urobilinogen 1.0 12/06/2019 08:39 PM    Nitrites NEGATIVE  12/06/2019 08:39 PM    Leukocyte Esterase NEGATIVE  12/06/2019 08:39 PM    Epithelial cells FEW 12/06/2019 08:39 PM    Bacteria NEGATIVE  12/06/2019 08:39 PM    WBC 0-4 12/06/2019 08:39 PM    RBC 0-5 12/06/2019 08:39 PM         Medications Reviewed:     Current Facility-Administered Medications   Medication Dose Route Frequency    sodium chloride (NS) flush 5-40 mL  5-40 mL IntraVENous Q8H    sodium chloride (NS) flush 5-40 mL  5-40 mL IntraVENous PRN    acetaminophen (TYLENOL) tablet 650 mg  650 mg Oral Q4H PRN    heparin (porcine) injection 5,000 Units  5,000 Units SubCUTAneous Q8H    piperacillin-tazobactam (ZOSYN) 3.375 g in 0.9% sodium chloride (MBP/ADV) 100 mL  3.375 g IntraVENous Q8H    vancomycin dosing by pharmacy   Other Rx Dosing/Monitoring    vancomycin (VANCOCIN) 1250 mg in  ml infusion  1,250 mg IntraVENous Q8H    oxyCODONE-acetaminophen (PERCOCET) 5-325 mg per tablet 1 Tab  1 Tab Oral Q4H PRN     ______________________________________________________________________  EXPECTED LENGTH OF STAY: - - -  ACTUAL LENGTH OF STAY:          2                 Roland Bajwa MD

## 2019-12-09 LAB
ALBUMIN SERPL-MCNC: 2.6 G/DL (ref 3.5–5)
ALBUMIN/GLOB SERPL: 0.6 {RATIO} (ref 1.1–2.2)
ALP SERPL-CCNC: 70 U/L (ref 45–117)
ALT SERPL-CCNC: 26 U/L (ref 12–78)
ANION GAP SERPL CALC-SCNC: 7 MMOL/L (ref 5–15)
AST SERPL-CCNC: 21 U/L (ref 15–37)
BACTERIA SPEC CULT: ABNORMAL
BACTERIA SPEC CULT: NORMAL
BASOPHILS # BLD: 0.1 K/UL (ref 0–0.1)
BASOPHILS NFR BLD: 1 % (ref 0–1)
BILIRUB SERPL-MCNC: 0.3 MG/DL (ref 0.2–1)
BUN SERPL-MCNC: 3 MG/DL (ref 6–20)
BUN/CREAT SERPL: 4 (ref 12–20)
CALCIUM SERPL-MCNC: 9 MG/DL (ref 8.5–10.1)
CHLORIDE SERPL-SCNC: 109 MMOL/L (ref 97–108)
CO2 SERPL-SCNC: 26 MMOL/L (ref 21–32)
CREAT SERPL-MCNC: 0.82 MG/DL (ref 0.7–1.3)
DIFFERENTIAL METHOD BLD: ABNORMAL
EOSINOPHIL # BLD: 0.1 K/UL (ref 0–0.4)
EOSINOPHIL NFR BLD: 1 % (ref 0–7)
ERYTHROCYTE [DISTWIDTH] IN BLOOD BY AUTOMATED COUNT: 11.5 % (ref 11.5–14.5)
GLOBULIN SER CALC-MCNC: 4.2 G/DL (ref 2–4)
GLUCOSE SERPL-MCNC: 98 MG/DL (ref 65–100)
GRAM STN SPEC: ABNORMAL
GRAM STN SPEC: ABNORMAL
HCT VFR BLD AUTO: 38.3 % (ref 36.6–50.3)
HGB BLD-MCNC: 12.5 G/DL (ref 12.1–17)
IMM GRANULOCYTES # BLD AUTO: 0.1 K/UL (ref 0–0.04)
IMM GRANULOCYTES NFR BLD AUTO: 1 % (ref 0–0.5)
LYMPHOCYTES # BLD: 2.4 K/UL (ref 0.8–3.5)
LYMPHOCYTES NFR BLD: 20 % (ref 12–49)
MAGNESIUM SERPL-MCNC: 2.1 MG/DL (ref 1.6–2.4)
MCH RBC QN AUTO: 29.1 PG (ref 26–34)
MCHC RBC AUTO-ENTMCNC: 32.6 G/DL (ref 30–36.5)
MCV RBC AUTO: 89.3 FL (ref 80–99)
MONOCYTES # BLD: 1.2 K/UL (ref 0–1)
MONOCYTES NFR BLD: 10 % (ref 5–13)
NEUTS SEG # BLD: 8.1 K/UL (ref 1.8–8)
NEUTS SEG NFR BLD: 67 % (ref 32–75)
NRBC # BLD: 0 K/UL (ref 0–0.01)
NRBC BLD-RTO: 0 PER 100 WBC
PLATELET # BLD AUTO: 505 K/UL (ref 150–400)
PMV BLD AUTO: 8.8 FL (ref 8.9–12.9)
POTASSIUM SERPL-SCNC: 3.4 MMOL/L (ref 3.5–5.1)
PROT SERPL-MCNC: 6.8 G/DL (ref 6.4–8.2)
RBC # BLD AUTO: 4.29 M/UL (ref 4.1–5.7)
SERVICE CMNT-IMP: ABNORMAL
SERVICE CMNT-IMP: NORMAL
SODIUM SERPL-SCNC: 142 MMOL/L (ref 136–145)
WBC # BLD AUTO: 12 K/UL (ref 4.1–11.1)

## 2019-12-09 PROCEDURE — 74011250636 HC RX REV CODE- 250/636: Performed by: FAMILY MEDICINE

## 2019-12-09 PROCEDURE — 74011250637 HC RX REV CODE- 250/637: Performed by: INTERNAL MEDICINE

## 2019-12-09 PROCEDURE — 85025 COMPLETE CBC W/AUTO DIFF WBC: CPT

## 2019-12-09 PROCEDURE — 77030011943

## 2019-12-09 PROCEDURE — 74011250636 HC RX REV CODE- 250/636: Performed by: INTERNAL MEDICINE

## 2019-12-09 PROCEDURE — 36415 COLL VENOUS BLD VENIPUNCTURE: CPT

## 2019-12-09 PROCEDURE — 74011000258 HC RX REV CODE- 258: Performed by: FAMILY MEDICINE

## 2019-12-09 PROCEDURE — 80053 COMPREHEN METABOLIC PANEL: CPT

## 2019-12-09 PROCEDURE — 65270000029 HC RM PRIVATE

## 2019-12-09 PROCEDURE — 83735 ASSAY OF MAGNESIUM: CPT

## 2019-12-09 RX ORDER — CEFAZOLIN SODIUM/WATER 2 G/20 ML
2 SYRINGE (ML) INTRAVENOUS EVERY 8 HOURS
Status: DISCONTINUED | OUTPATIENT
Start: 2019-12-09 | End: 2019-12-11 | Stop reason: HOSPADM

## 2019-12-09 RX ORDER — POTASSIUM CHLORIDE 750 MG/1
40 TABLET, FILM COATED, EXTENDED RELEASE ORAL
Status: COMPLETED | OUTPATIENT
Start: 2019-12-09 | End: 2019-12-09

## 2019-12-09 RX ADMIN — HEPARIN SODIUM 5000 UNITS: 5000 INJECTION INTRAVENOUS; SUBCUTANEOUS at 13:49

## 2019-12-09 RX ADMIN — VANCOMYCIN HYDROCHLORIDE 1250 MG: 10 INJECTION, POWDER, LYOPHILIZED, FOR SOLUTION INTRAVENOUS at 00:27

## 2019-12-09 RX ADMIN — Medication 10 ML: at 13:50

## 2019-12-09 RX ADMIN — PIPERACILLIN AND TAZOBACTAM 3.38 G: 3; .375 INJECTION, POWDER, LYOPHILIZED, FOR SOLUTION INTRAVENOUS at 05:03

## 2019-12-09 RX ADMIN — Medication 2 G: at 18:44

## 2019-12-09 RX ADMIN — PIPERACILLIN AND TAZOBACTAM 3.38 G: 3; .375 INJECTION, POWDER, LYOPHILIZED, FOR SOLUTION INTRAVENOUS at 13:45

## 2019-12-09 RX ADMIN — Medication 10 ML: at 05:07

## 2019-12-09 RX ADMIN — OXYCODONE HYDROCHLORIDE AND ACETAMINOPHEN 1 TABLET: 5; 325 TABLET ORAL at 23:14

## 2019-12-09 RX ADMIN — HEPARIN SODIUM 5000 UNITS: 5000 INJECTION INTRAVENOUS; SUBCUTANEOUS at 23:11

## 2019-12-09 RX ADMIN — OXYCODONE HYDROCHLORIDE AND ACETAMINOPHEN 1 TABLET: 5; 325 TABLET ORAL at 19:18

## 2019-12-09 RX ADMIN — OXYCODONE HYDROCHLORIDE AND ACETAMINOPHEN 1 TABLET: 5; 325 TABLET ORAL at 14:32

## 2019-12-09 RX ADMIN — OXYCODONE HYDROCHLORIDE AND ACETAMINOPHEN 1 TABLET: 5; 325 TABLET ORAL at 05:16

## 2019-12-09 RX ADMIN — HEPARIN SODIUM 5000 UNITS: 5000 INJECTION INTRAVENOUS; SUBCUTANEOUS at 06:34

## 2019-12-09 RX ADMIN — POTASSIUM CHLORIDE 40 MEQ: 750 TABLET, FILM COATED, EXTENDED RELEASE ORAL at 09:50

## 2019-12-09 RX ADMIN — Medication 10 ML: at 23:11

## 2019-12-09 RX ADMIN — VANCOMYCIN HYDROCHLORIDE 1250 MG: 10 INJECTION, POWDER, LYOPHILIZED, FOR SOLUTION INTRAVENOUS at 09:54

## 2019-12-09 NOTE — PROGRESS NOTES
On IV antibiotcs-oob ad nita  Problem: General Medical Care Plan  Goal: *Vital signs within specified parameters  Outcome: Progressing Towards Goal  Goal: *Absence of infection signs and symptoms  Outcome: Progressing Towards Goal     Problem: Patient Education: Go to Patient Education Activity  Goal: Patient/Family Education  Outcome: Progressing Towards Goal     Problem: General Infection Care Plan (Adult and Pediatric)  Goal: Improvement in signs and symptoms of infection  Outcome: Progressing Towards Goal

## 2019-12-09 NOTE — PROGRESS NOTES
TRANSFER - IN REPORT:    Verbal report received from ALEJANDRINA Real(name) on Karely Hanson  being received from [facility]) for routine progression of care      Report consisted of patients Situation, Background, Assessment and   Recommendations(SBAR). Information from the following report(s) SBAR, Intake/Output, MAR and Recent Results was reviewed with the receiving nurse. Opportunity for questions and clarification was provided. Assessment will be completed upon patients arrival to unit and care assumed.

## 2019-12-09 NOTE — PROGRESS NOTES
..Bedside shift change report given to Mayra Rojas RN (oncoming nurse) by [facility], RN (offgoing nurse). Report included the following information SBAR, Kardex and MAR.

## 2019-12-09 NOTE — PROGRESS NOTES
NUTRITION       Nutrition screening referral was triggered based on results obtained during nursing admission assessment. The patient's chart was reviewed and I personally spoke with pt. Nutrition assessment is not indicated at this time. Plan to see patient for rescreen as indicated. Thank you.      Reyes Stovall RD

## 2019-12-09 NOTE — PROGRESS NOTES
Problem: General Medical Care Plan  Goal: *Progressive mobility and function (eg: ADL's)  Outcome: Progressing Towards Goal  Note:   Pt attends to own ADL's calls when assistance is needed. Problem: Falls - Risk of  Goal: *Absence of Falls  Description  Document Devere Hysham Fall Risk and appropriate interventions in the flowsheet. Outcome: Progressing Towards Goal  Note: Fall Risk Interventions:            Medication Interventions: Assess postural VS orthostatic hypotension, Evaluate medications/consider consulting pharmacy, Patient to call before getting OOB, Teach patient to arise slowly, Utilize gait belt for transfers/ambulation    Elimination Interventions: Call light in reach, Elevated toilet seat, Patient to call for help with toileting needs, Stay With Me (per policy), Toileting schedule/hourly rounds, Toilet paper/wipes in reach              Problem: General Medical Care Plan  Goal: *Vital signs within specified parameters  Note:   Vital signs stable, pt c/o occasional pain in left flank. Will continue to monitor     Bedside shift change report given to [address] [place] [address] [address] (oncoming nurse) by Destinee Giang RN (offgoing nurse). Report included the following information SBAR, Kardex, ED Summary, Procedure Summary, Intake/Output, MAR, Recent Results, Med Rec Status, Cardiac Rhythm NSR and Procedure Verification.

## 2019-12-09 NOTE — PROGRESS NOTES
Urology Progress Note    Patient: Paradise Ramos MRN: [id number]  SSN: [id number]    YOB: 1970  Age: 52 y.o. Sex: male        ADMITTED: 2019 to Urvashi Lombardo MD for Pyelonephritis [N12]  POD# * No surgery found *     Feels well. No complaints. Afebrile. Drain has put out 30 cc over the past 24 hours. Vitals: Temp (24hrs), Av.2 °F (36.8 °C), Min:97.8 °F (36.6 °C), Max:98.6 °F (37 °C)                   Blood pressure 129/75, pulse 66, temperature 98.6 °F (37 °C), resp. rate 19, height 6' (1.829 m), weight 86.1 kg (189 lb 13.1 oz), SpO2 96 %. Intake and Output:   1901 -  0700  In: 4239 [P.O.:690; I.V.:1050]  Out: 5779 [Urine:3550; Drains:20]      Cultures pending. Labs:  Labs:   Lab Results   Component Value Date/Time    WBC 12.0 (H) 2019 06:06 AM    HGB 12.5 2019 06:06 AM    Creatinine 0.82 2019 06:06 AM         Assessment/Plan:  Continue antibiotics. Await cultures.   Repeat CT prior to drain removal.       Signed By: Hank Lauren MD - 2019

## 2019-12-09 NOTE — PROGRESS NOTES
Problem: General Medical Care Plan  Goal: Progressive mobility and function (eg: ADL's)  Outcome: Progressing Towards Goal    Pt up with assistance, steady gait. Problem: Pain  Goal: Control of Pain  Outcome: Progressing Towards Goal    Administered Oxycodone for pain as ordered, pt reported pain at 0/10 after medicating. Problem: Falls - Risk of  Goal: Absence of Falls  Outcome: Progressing Towards Goal    Pt called for assistance before getting out of bed. Problem: General Infection Care Plan (Adult and Pediatric)  Goal: Optimize nutritional status  Outcome: Progressing Towards Goal    Pt on regular diet, tolerating well. Purposeful Hourly rounding done, pt in NSR, on RA, O2 saturation greater than 93%, ambulated to bathroom, had small bowel movement, has neurogenic bladder, pt self bathed, self caths, medicated with Oxycodone for pain, reassessment 0/10.    0730-Bedside shift change report given to [address] [place] [address] (oncoming nurse) by Wendy Encarnacion RN (offgoing nurse). Report included the following information SBAR, Kardex, ED Summary, Intake/Output, MAR, Accordion, Recent Results, Med Rec Status, and Cardiac Rhythm NSR .

## 2019-12-09 NOTE — PROGRESS NOTES
Primary Nurse Josseline Hurtado RN and Jojo Antonio RN performed a dual skin assessment on this patient Impairment noted- see wound doc flow sheet. Incision with accordian drain in place on L flank.   Deonte score is 23

## 2019-12-10 ENCOUNTER — APPOINTMENT (OUTPATIENT)
Dept: CT IMAGING | Age: 49
DRG: 463 | End: 2019-12-10
Attending: UROLOGY
Payer: MEDICAID

## 2019-12-10 PROCEDURE — 74150 CT ABDOMEN W/O CONTRAST: CPT

## 2019-12-10 PROCEDURE — 74011250636 HC RX REV CODE- 250/636: Performed by: FAMILY MEDICINE

## 2019-12-10 PROCEDURE — 74011250636 HC RX REV CODE- 250/636: Performed by: INTERNAL MEDICINE

## 2019-12-10 PROCEDURE — 74011250637 HC RX REV CODE- 250/637: Performed by: INTERNAL MEDICINE

## 2019-12-10 PROCEDURE — 77010033678 HC OXYGEN DAILY

## 2019-12-10 PROCEDURE — 65270000029 HC RM PRIVATE

## 2019-12-10 PROCEDURE — 94760 N-INVAS EAR/PLS OXIMETRY 1: CPT

## 2019-12-10 PROCEDURE — 77030011943

## 2019-12-10 PROCEDURE — C1729 CATH, DRAINAGE: HCPCS

## 2019-12-10 RX ORDER — POTASSIUM CHLORIDE 750 MG/1
40 TABLET, FILM COATED, EXTENDED RELEASE ORAL
Status: COMPLETED | OUTPATIENT
Start: 2019-12-10 | End: 2019-12-10

## 2019-12-10 RX ADMIN — Medication 10 ML: at 14:09

## 2019-12-10 RX ADMIN — OXYCODONE HYDROCHLORIDE AND ACETAMINOPHEN 1 TABLET: 5; 325 TABLET ORAL at 11:04

## 2019-12-10 RX ADMIN — HEPARIN SODIUM 5000 UNITS: 5000 INJECTION INTRAVENOUS; SUBCUTANEOUS at 21:48

## 2019-12-10 RX ADMIN — POTASSIUM CHLORIDE 40 MEQ: 750 TABLET, FILM COATED, EXTENDED RELEASE ORAL at 14:32

## 2019-12-10 RX ADMIN — HEPARIN SODIUM 5000 UNITS: 5000 INJECTION INTRAVENOUS; SUBCUTANEOUS at 06:57

## 2019-12-10 RX ADMIN — Medication 10 ML: at 03:01

## 2019-12-10 RX ADMIN — Medication 2 G: at 03:01

## 2019-12-10 RX ADMIN — Medication 2 G: at 10:57

## 2019-12-10 RX ADMIN — Medication 10 ML: at 21:48

## 2019-12-10 RX ADMIN — Medication 2 G: at 18:44

## 2019-12-10 RX ADMIN — OXYCODONE HYDROCHLORIDE AND ACETAMINOPHEN 1 TABLET: 5; 325 TABLET ORAL at 06:57

## 2019-12-10 RX ADMIN — HEPARIN SODIUM 5000 UNITS: 5000 INJECTION INTRAVENOUS; SUBCUTANEOUS at 14:09

## 2019-12-10 RX ADMIN — OXYCODONE HYDROCHLORIDE AND ACETAMINOPHEN 1 TABLET: 5; 325 TABLET ORAL at 19:28

## 2019-12-10 RX ADMIN — Medication 10 ML: at 06:57

## 2019-12-10 NOTE — PROGRESS NOTES
Seen by Dr. Elian Thomas earlier today. CT report:  IMPRESSION: Resolution of the left perinephric fluid collection after catheter  drainage. Subsequent to the interpretation of the study, the left perinephric  drain was removed    S:  Feeling much better    O: Afebrile, respirations even and unlabored, resting quietly in bed. A/P:  Per Dr. Jacobs See note, ok to discharge after drain is removed, antibiotics x 2 weeks. Follow up with Dr. Oliver Harris outpatient.

## 2019-12-10 NOTE — CONSULTS
Infectious Disease Consult Note    Reason for Consult: Renal abscess   Date of Consultation: December 10, 2019  Date of Admission: 12/6/2019  Referring Physician: Dr Vahid Johnson       HPI:    Mr. Kaela Del Rosario is a 44-year-old gentleman with a history of neurogenic bladder per patient and does self catheterizations, left foot surgery status post fracture with hardware who was transferred him from [address] [place] [address] for perinephric abscess. He reports having left flank pain that has not improved for a couple of weeks. He says he was given oral antibiotics and thinks it was ciprofloxacin without any improvement . He does self catheterizations about 4 times a day. Denied any purulence seen from the catheter site, fevers chills, nausea vomiting diarrhea, abdominal pain. He reports that the flank pain is much improved and resolved at this time. He had IR drain the abscess and per the report had cloudy purulent material that was drained and sent for cultures. Abscess culture grew MSSA. His blood culture was negative on admission.     He has been started on IV cefazolin    He has been seen by urology this admission    He is tolerating antibiotics well and I am consulted today for antibiotic recommendations        Past Medical History:  Urological procedure/cystoscopy  Patient reports neurogenic bladder with self catheterizations    Surgical History:  Past Surgical History:   Procedure Laterality Date    HX ORTHOPAEDIC Left     ORIF foot    HX UROLOGICAL  2011    bladder         Family History:   Family History   Problem Relation Age of Onset    No Known Problems Mother     Cancer Father          Social History:     · Living Situation: Lives at home, works for himself with carpentry  · Tobacco: Smoker  · Alcohol: Denied  · Illicit Drugs: Denied  · Sexual History: Denied  · Travel History denied  · Exposures:   · Animal/Pet: Has a dog      Allergies:  No Known Allergies      Review of Systems:     Gen: Negative for chills, fevers, weight loss, weight gain   HEENT: Negative for headache, vision changes, ear ache or discharge, tingling,  nasal discharge, swelling,  lumps in neck, sores on tongue   CV:  Negative for chest pain, dyspnea on exertion, leg edema   Lungs: Negative for shortness of breath, cough, wheezing   Abdomen: Negative for abdominal pain, nausea, vomiting, diarrhea, constipation   Genitourinary: Negative for genital pain or genital discharge  , + L flank pain    Neuro: Negative for headache, numbness, tingling, extremity weakness,  syncope, seizures    Skin: Negative for rash, sores/open wounds   Musculoskeletal: Negative for joint pain, joint swelling, joint erythema    Endocrine: Negative for high or low blood sugars, heat or cold intolerance    Psych: Negative for manic behavior     Medications:  No current facility-administered medications on file prior to encounter. Current Outpatient Medications on File Prior to Encounter   Medication Sig Dispense Refill    vancomycin HCl (VANCOMYCIN IV) 2,500 mg PE by IntraVENous route once.  piperacillin-tazobactam (ZOSYN) 3.375 gram injection 3.375 g by IntraVENous route once.          Current Facility-Administered Medications:     ceFAZolin (ANCEF) 2 g/20 mL in sterile water IV syringe, 2 g, IntraVENous, Q8H, Steven Parra MD, 2 g at 12/10/19 1057    sodium chloride (NS) flush 5-40 mL, 5-40 mL, IntraVENous, Q8H, Mk Etienne MD, 10 mL at 12/10/19 1409    sodium chloride (NS) flush 5-40 mL, 5-40 mL, IntraVENous, PRN, Kris Doan MD, 10 mL at 12/10/19 0301    acetaminophen (TYLENOL) tablet 650 mg, 650 mg, Oral, Q4H PRN, Kris Doan MD, 650 mg at 12/07/19 1612    heparin (porcine) injection 5,000 Units, 5,000 Units, SubCUTAneous, Q8H, Jeffrey Etienne MD, 5,000 Units at 12/10/19 1409    oxyCODONE-acetaminophen (PERCOCET) 5-325 mg per tablet 1 Tab, 1 Tab, Oral, Q4H PRN, Ene Davis MD, 1 Tab at 12/10/19 1104        Physical Exam:    Vitals: Patient Vitals for the past 24 hrs:   Temp Pulse Resp BP SpO2   12/10/19 1044 97.6 °F (36.4 °C) 69 18 132/82 95 %   12/10/19 0309 97.6 °F (36.4 °C) 70 18 126/84 97 %   12/09/19 2000 97.6 °F (36.4 °C) 63 17 130/83 97 %   ·   · GEN: NAD  · HEENT: Normocephalic, atraumatic, PERRL, no scleral icterus,  no cervical lymphadenopathy, no sinus tenderness, no thrush  · CV: S1, S2 heard regularly,  · Lungs: Clear to auscultation bilaterally  · Abdomen: soft, non distended, non tender,  no CVA tenderness , + bandage L flank area, no erythema around site   · Genitourinary: No Singh  · Extremities: no edema  · Neuro: Alert, oriented to time, place and situation, moves all extremities to commands, verbal   · Skin: no rash  MS nontender to palpation of spine      Labs:   Reviewed  White count was 22 on 12/7/2019 which is improved to 12 on 12/9/2019  Creatinine 0.82  Microbiology Data:     Abscess         Component Value Ref Range & Units Status   Special Requests: PERINEPHRIC   Final   GRAM STAIN 4+ WBCS SEEN    Final   GRAM STAIN    Final   3+ GRAM POSITIVE COCCI IN CLUSTERS    Culture result: Abnormal     Final   HEAVY STAPHYLOCOCCUS AUREUS    Susceptibility      Staphylococcus aureus     KATHERIN    Ampicillin/sulbactam ($) <=8/4 ug/mL S    Cefazolin ($) <=4 ug/mL S    Ciprofloxacin ($) <=1 ug/mL S    Clindamycin ($) <=0.5 ug/mL S    Daptomycin ($$$$$) <=0.5 ug/mL S    Erythromycin ($$$$) <=0.5 ug/mL S    Gentamicin ($) <=4 ug/mL S    Linezolid ($$$$$) 2 ug/mL S    Oxacillin <=0.25 ug/mL S    Rifampin ($$$$) <=1 ug/mL S1    Tetracycline <=4 ug/mL S    Trimeth/Sulfa <=0.5/9.5 u... S    Vancomycin ($) 1 ug/mL S        Specimen Information: Blood        Component Value Ref Range & Units Status   Special Requests: NO SPECIAL REQUESTS    Preliminary   Culture result: NO GROWTH 3 DAYS    Preliminary   Result History       Specimen Information: Singh Specimen; Urine        Component Value Ref Range & Units Status   Special Requests: NO SPECIAL REQUESTS    Final   [place] Count <1,000 CFU/ML    Final   Culture result: NO GROWTH 2 DAYS    Final   Result History       Imaging:   CT 12/7/19  FINDINGS:   The procedure including the risk of bleeding and infection was explained to the  patient and verbal and written informed consent was obtained. The patient was  placed into the CT scanner in the prone position, images were obtained  and a  site was localized for drainage of the left perinephric abscess. The skin was  marked and prepped and draped in sterile fashion and anesthetized with 1%  lidocaine. An 18 gauge Elizabeth needle was advanced into the collection is had  spontaneous return of cloudy purulent material..  A guidewire was introduced and  the tract was dilated with 6 and 8 Western Avelina dilators. An 8.5 Russian Resolve  catheter was advanced over the guidewire and the pigtail reformed. A total of  15 mL of pus was aspirated. The catheter was secured to the skin with an  adhesive fixation device and attached to a drainage bag. Specimens were sent to  laboratory for anaerobic and aerobic culture and Gram stain. The patient was  monitored by the radiology nurse throughout the procedure with pulse oximetry  and EKG monitoring and Versed and fentanyl were administered for anxiolysis and  pain control. The patient tolerated the procedure well without apparent  complication and will be initially recovered in CT and then return to his  hospital bed/room.     IMPRESSION  IMPRESSION: CT guided 8.5 Russian drainage catheter placement into left  perinephric abscess achieved. Specimens were sent for cultures and Gram stain. CT 12/10/19  FINDINGS:   The visualized lung bases are clear. No pleural effusion. Heart size is normal.  No pericardial effusion. The liver is normal in attenuation. The gallbladder is nondistended. The spleen  is normal in size. The right kidney is normal, with no stones or hydronephrosis.   Left kidney demonstrates no stones or hydronephrosis. The previously seen left  perinephric collection has resolved after transcatheter drainage. There is a  moderate amount of residual stranding around the left kidney. The adrenal glands  and pancreas are normal. The stomach and imaged portion of the small and large  bowel are normal. No ascites or lymphadenopathy. Osseous structures are within  normal limits.        IMPRESSION  IMPRESSION: Resolution of the left perinephric fluid collection after catheter  drainage. Subsequent to the interpretation of the study, the left perinephric  drain was removed.     Procedures   Renal abscess drainage  12/7/19 1/8/16  CYSTOSCOPY LITHOLAPAXY WITH HOLMIUM LASER     12/28/09  Cystoscopy with high-power holmium laser cystolithotripsy,  multiple large bladder stones. 6/2009  Holmium laser cystolithotripsy of multiple large  bladder stones.       Assessment / Plan:   Mr. Gregg Blanca is a 51-year-old gentleman with a history of neurogenic bladder with self-catheterization history per patient, left foot fracture status post surgery with hardware who was transferred with perinephric abscess he status post drainage with abscess cultures positive for MSSA blood cultures have been negative however he was exposed to antibiotics which he thinks was ciprofloxacin prior to admission    1) perinephric abscess  MSSA from drainage cultures  Repeat CT after drainage read as resolution of the left perinephric fluid collection  Left perinephric drain was removed  He is currently on IV cefazolin which I agree with  Usually nephric abscesses are treated 2 to 3 weeks with IV antibiotics  I discussed with him IV antibiotics given the virulence of staph aureus and recommended IV antibiotics   Patient reports that he does not want any IV antibiotics and would like to take oral antibiotics   I discussed with him risk for recurrent infection with catheterizations  He expressed understanding  He says he can follow-up for repeat imaging and will take oral antibiotics  discussed with him especially of bacteremia risk for seeding to left foot hardware  Antibiotic side effects including risk for C. difficile GI adverse effects ability to lower seizures hematological suppression renal issues discussed    Recommend Keflex 500 mg 3 times daily for another 2-3 weeks  Reassess in 2 weeks with clinically as well as radiographically  F/U with me on 1/7/19 2.30 pm   [place] [address] [address] [facility]   Phone: [phone]  Fax   Check weekly CBC wt diff, cmp and fax to me at [phone]       2) Self catherizations,   Neurogenic bladder     3) S/P L foot fracture wt surgery. ORIf/hardware    4) dvt Px     Will sign off at this time. Thank for the opportunity to participate in the care of this patient. Please contact with questions or concerns.              Candelaria Peabody, DO  6:24 PM

## 2019-12-10 NOTE — PROGRESS NOTES
Pt seen by Dr. Jeimy Manning this morning. CT Report:   IMPRESSION: Resolution of left perinephric fluid collection after catheter drainage. Subsequent to the interpretation of the study, the left perinephric drain was removed. S: Feeling much better. O: Afebrile, respirations even and unlabored. Resting quietly in bed. Patient Vitals for the past 12 hrs:   Temp Pulse Resp BP SpO2   12/10/19 1044 97.6 °F (36.4 °C) 69 18 132/82 95 %   12/10/19 0309 97.6 °F (36.4 °C) 70 18 126/84 97 %     No results found for this or any previous visit (from the past 12 hour(s)). A/P: Per Dr. Sigrid Reno note, ok to discharge after drain is removed. Antibiotics x2 weeks. Outpatient follow up with Ning Rosario.

## 2019-12-10 NOTE — PROGRESS NOTES
[facility] Adult  Hospitalist Group                                                                                          Hospitalist Progress Note  Cele Dickerson MD  Answering service: [phone] from in house phone        Date of Service:  12/10/2019  NAME:  Jessica Hall  :  1970  MRN:  [id number]      Admission Summary:   The patient is a 55-year-old male transferred from [facility] with the above-mentioned symptom. The patient reports that he has history of neurogenic bladder, he self-caths himself. About two weeks back, he started noticing some left flank pain. The patient reports the pain got much worse in the last three days and he presented to [facility].  The patient reports he follows up with Dr. Rupesh Singletary from Urology. The patient was seen over at [facility] [place], had a CT done, there was a concern for a perinephric abscess, and the patient was sent to [facility] ER for further management and evaluation. The patient reports that he has not had any fever or chills, but does admit to some nausea. Denies any vomiting. The patient denies any headache, blurry vision, sore throat, trouble swallowing, trouble with speech, any chest pain, shortness of breath, cough. Does admit to chills, but denies any fever, denies any constipation, diarrhea, focal or generalized neurological weakness, recent travel, sick contacts, falls, injuries, hematemesis, melena, hemoptysis, hematuria, or any concerns or problems. Interval history / Subjective:   Patient feels ok, just came back from CT, collection seem to have resolved, and drain removed. Assessment & Plan:     1.  L Pyelonephritis with developing left posterolateral perinephric abscess. Status post CT-guided IR drainage of perinephric abscess on 2019  -15 mL pus removed in IR, soft drain catheter in place  - Appreciate IR, Dr. Niurka Sánchez. - Cx s/o MSSA. Would change Abx to IV Ancef. Would likely need IV abx for 2-3 weeks. - Would get ID consultation for recommendatoins.  - Continue supportive care, pain control, and monitor closely. - Urology, Dr. Carmina Kang, on board. Repeat CT 12/10 collection resolution- removed drain. # Neurogenic bladder, self cath dependent  # Tobacco abuse -Patient counseled. # Leukocytosis, secondary to pyelonephritis -Improving. monitor    Code status: Full code  DVT prophylaxis: Heparin  Care Plan discussed with: Patient/Family and Nurse  Disposition: Home w/Family and TBD     Hospital Problems  Never Reviewed          Codes Class Noted POA    Pyelonephritis ICD-10-CM: N12  ICD-9-CM: 590.80  12/6/2019 Unknown            Review of Systems:   A comprehensive review of systems was negative except for that written in the HPI. Vital Signs:    Last 24hrs VS reviewed since prior progress note. Most recent are:  Visit Vitals  /82 (BP 1 Location: Right arm, BP Patient Position: At rest)   Pulse 69   Temp 97.6 °F (36.4 °C)   Resp 18   Ht 6' (1.829 m)   Wt 86.1 kg (189 lb 13.1 oz)   SpO2 95%   BMI 25.74 kg/m²         Intake/Output Summary (Last 24 hours) at 12/10/2019 1409  Last data filed at 12/10/2019 0707  Gross per 24 hour   Intake 350 ml   Output 1110 ml   Net -760 ml        Physical Examination:     Constitutional:  No acute distress, cooperative, pleasant        Resp:  CTA bilaterally. No wheezing/rhonchi/rales. No accessory muscle use   CV:  Regular rhythm, normal rate, no murmurs    GI:  Soft, non distended, mild tenderness left upper quadrant and lateral left flank subcostally . Musculoskeletal:  No edema, warm    Neurologic:  Moves all extremities. AAOx3     Skin:  Good turgor, no rashes or ulcers  :  Tenderness resolved. Drain in left flank in place with ~10ml serosanguinous discharge in bag. No CVA tenderness  Eyes:  EOMI. Anicteric sclerae, PERRL.   Data Review:   Review and/or order of clinical lab test  Review and/or order of tests in the radiology section of CPT  Review and/or order of tests in the medicine section of CPT      Labs:     Recent Labs     12/09/19  0606 12/08/19  0019   WBC 12.0* 18.9*   HGB 12.5 13.2   HCT 38.3 41.2   * 514*     Recent Labs     12/09/19  0606 12/08/19  0019    139   K 3.4* 3.6   * 107   CO2 26 26   BUN 3* 6   CREA 0.82 0.86   GLU 98 107*   CA 9.0 9.0   MG 2.1  --      Recent Labs     12/09/19  0606 12/08/19  0019   SGOT 21 14*   ALT 26 21   AP 70 77   TBILI 0.3 0.5   TP 6.8 7.0   ALB 2.6* 2.6*   GLOB 4.2* 4.4*     No results for input(s): INR, PTP, APTT, INREXT, INREXT in the last 72 hours. No results for input(s): FE, TIBC, PSAT, FERR in the last 72 hours. No results found for: FOL, RBCF   No results for input(s): PH, PCO2, PO2 in the last 72 hours. No results for input(s): CPK, CKNDX, TROIQ in the last 72 hours.     No lab exists for component: CPKMB  No results found for: CHOL, CHOLX, CHLST, CHOLV, HDL, HDLP, LDL, LDLC, DLDLP, TGLX, TRIGL, TRIGP, CHHD, CHHDX  No results found for: [facility]  Lab Results   Component Value Date/Time    Color DARK YELLOW 12/06/2019 08:39 PM    Appearance CLEAR 12/06/2019 08:39 PM    Specific gravity 1.020 12/06/2019 08:39 PM    Specific gravity 1.010 01/08/2016 06:38 AM    pH (UA) 6.0 12/06/2019 08:39 PM    Protein TRACE (A) 12/06/2019 08:39 PM    Glucose NEGATIVE  12/06/2019 08:39 PM    Ketone 15 (A) 12/06/2019 08:39 PM    Bilirubin NEGATIVE  01/08/2016 06:38 AM    Urobilinogen 1.0 12/06/2019 08:39 PM    Nitrites NEGATIVE  12/06/2019 08:39 PM    Leukocyte Esterase NEGATIVE  12/06/2019 08:39 PM    Epithelial cells FEW 12/06/2019 08:39 PM    Bacteria NEGATIVE  12/06/2019 08:39 PM    WBC 0-4 12/06/2019 08:39 PM    RBC 0-5 12/06/2019 08:39 PM         Medications Reviewed:     Current Facility-Administered Medications   Medication Dose Route Frequency    ceFAZolin (ANCEF) 2 g/20 mL in sterile water IV syringe  2 g IntraVENous Q8H    sodium chloride (NS) flush 5-40 mL  5-40 mL IntraVENous Q8H    sodium chloride (NS) flush 5-40 mL  5-40 mL IntraVENous PRN    acetaminophen (TYLENOL) tablet 650 mg  650 mg Oral Q4H PRN    heparin (porcine) injection 5,000 Units  5,000 Units SubCUTAneous Q8H    oxyCODONE-acetaminophen (PERCOCET) 5-325 mg per tablet 1 Tab  1 Tab Oral Q4H PRN     ______________________________________________________________________  EXPECTED LENGTH OF STAY: 3d 0h  ACTUAL LENGTH OF STAY:          4                 Carmelita Roque MD

## 2019-12-10 NOTE — PROGRESS NOTES
Urology Progress Note    Patient: Fransisco Samuels MRN: [id number]  SSN: [id number]    YOB: 1970  Age: 52 y.o. Sex: male        ADMITTED: 2019 to Claire Katz MD for Pyelonephritis [N12]  POD# * No surgery found *     He feels well and has no complaints. He hopes to go home. Afebrile. Scant drainage from percutaneous drain only. Culture results noted. Pansensitive staph aureus. Vitals: Temp (24hrs), Av.7 °F (36.5 °C), Min:97.2 °F (36.2 °C), Max:98.3 °F (36.8 °C)                   Blood pressure 126/84, pulse 70, temperature 97.6 °F (36.4 °C), resp. rate 18, height 6' (1.829 m), weight 86.1 kg (189 lb 13.1 oz), SpO2 97 %. Intake and Output:   1901 - 12/10 0700  In: 5892 [P.O.:590; I.V.:700]  Out: 1835 [Urine:1825; Drains:10]            Labs:  Labs:   Lab Results   Component Value Date/Time    WBC 12.0 (H) 2019 06:06 AM    HGB 12.5 2019 06:06 AM    Creatinine 0.82 2019 06:06 AM         Assessment/Plan:  CT scan today. IR to remove drain if no significant collection. Okay for discharge from my standpoint after drain removed. Complete total of 2 weeks of antibiotics. Follow-up with Dr. Luis A Cano as outpatient.        Signed By: Gen Hill MD - December 10, 2019

## 2019-12-10 NOTE — PROGRESS NOTES
[facility] [facility] Adult  Hospitalist Group                                                                                          Hospitalist Progress Note  Gina Lundberg MD  Answering service: [phone] from in house phone        Date of Service:  2019  NAME:  Jessica Hall  :  1970  MRN:  [id number]      Admission Summary:   The patient is a 22-year-old male transferred from [facility] with the above-mentioned symptom. The patient reports that he has history of neurogenic bladder, he self-caths himself. About two weeks back, he started noticing some left flank pain. The patient reports the pain got much worse in the last three days and he presented to [facility].  The patient reports he follows up with Dr. Rupesh Singletary from Urology. The patient was seen over at [facility] [place], had a CT done, there was a concern for a perinephric abscess, and the patient was sent to [facility] ER for further management and evaluation. The patient reports that he has not had any fever or chills, but does admit to some nausea. Denies any vomiting. The patient denies any headache, blurry vision, sore throat, trouble swallowing, trouble with speech, any chest pain, shortness of breath, cough. Does admit to chills, but denies any fever, denies any constipation, diarrhea, focal or generalized neurological weakness, recent travel, sick contacts, falls, injuries, hematemesis, melena, hemoptysis, hematuria, or any concerns or problems. Interval history / Subjective:   Patient followed up for left posterolateral perinephric abscess. Status post CT-guided IR drainage of the abscess with drain placement done on 2019. Patient is feeling much better. Left flank and upper abdominal pain better. No nausea/vomiting/diarrhea. No fever/chills. Drain output is minimal ~10cc and tubing. No other concerns.      Assessment & Plan:     1.  L Pyelonephritis with developing left posterolateral perinephric abscess. Status post CT-guided IR drainage of perinephric abscess on 12/7/2019  -15 mL pus removed in IR, soft drain catheter in place  -Appreciate IR, Dr. Allyson Mcgowan.   -Cx s/o MSSA. Would change Abx to IV Ancef. Would likely need IV abx for 2-3 weeks.   -Would get ID consultation for recommendatoins.  -Continue supportive care, pain control, and monitor closely.    -Urology, Dr. Gregorio Flores, on board. Appreciate recommendation for repeat CT in 24-48 hours to confirm complete drainage prior to drain removal.  Dr. Thomas Covarrubias is the primary urologist.    # Neurogenic bladder, self cath dependent  -In and out cath as needed with I/O's monitoring. # Tobacco abuse.    -Patient counseled. # Leukocytosis, secondary to pyelonephritis with developing perinephric abscess.    -Improving. We will continue to monitor closely. Code status: Full code  DVT prophylaxis: Heparin    Care Plan discussed with: Patient/Family and Nurse  Disposition: Home w/Family and TBD     Hospital Problems  Never Reviewed          Codes Class Noted POA    Pyelonephritis ICD-10-CM: N12  ICD-9-CM: 590.80  12/6/2019 Unknown                Review of Systems:   A comprehensive review of systems was negative except for that written in the HPI. Vital Signs:    Last 24hrs VS reviewed since prior progress note. Most recent are:  Visit Vitals  BP (!) 151/91 (BP 1 Location: Right arm, BP Patient Position: At rest)   Pulse 75   Temp 97.2 °F (36.2 °C)   Resp 18   Ht 6' (1.829 m)   Wt 86.1 kg (189 lb 13.1 oz)   SpO2 96%   BMI 25.74 kg/m²         Intake/Output Summary (Last 24 hours) at 12/9/2019 2046  Last data filed at 12/9/2019 1635  Gross per 24 hour   Intake 1040 ml   Output 1085 ml   Net -45 ml        Physical Examination:             Constitutional:  No acute distress, cooperative, pleasant    ENT:  Oral mucous moist, oropharynx benign. Resp:  CTA bilaterally. No wheezing/rhonchi/rales.  No accessory muscle use   CV: Regular rhythm, normal rate, no murmurs, gallops, rubs    GI:  Soft, non distended, mild tenderness left upper quadrant and lateral left flank subcostally . normoactive bowel sounds, no hepatosplenomegaly     Musculoskeletal:  No edema, warm, 2+ pulses throughout    Neurologic:  Moves all extremities. AAOx3, CN II-XII reviewed     Skin:  Good turgor, no rashes or ulcers  :  Tenderness resolved. Drain in left flank in place with ~10ml serosanguinous discharge in bag. No CVA tenderness  Eyes:  EOMI. Anicteric sclerae, PERRL. Data Review:   Review and/or order of clinical lab test  Review and/or order of tests in the radiology section of CPT  Review and/or order of tests in the medicine section of CPT      Labs:     Recent Labs     12/09/19 0606 12/08/19  0019   WBC 12.0* 18.9*   HGB 12.5 13.2   HCT 38.3 41.2   * 514*     Recent Labs     12/09/19 0606 12/08/19 0019 12/07/19  0401    139 138   K 3.4* 3.6 3.8   * 107 108   CO2 26 26 24   BUN 3* 6 11   CREA 0.82 0.86 0.76   GLU 98 107* 83   CA 9.0 9.0 8.6   MG 2.1  --   --      Recent Labs     12/09/19 0606 12/08/19 0019 12/07/19  0401   SGOT 21 14* 9*   ALT 26 21 19   AP 70 77 84   TBILI 0.3 0.5 0.6   TP 6.8 7.0 6.7   ALB 2.6* 2.6* 2.6*   GLOB 4.2* 4.4* 4.1*     No results for input(s): INR, PTP, APTT, INREXT, INREXT in the last 72 hours. No results for input(s): FE, TIBC, PSAT, FERR in the last 72 hours. No results found for: FOL, RBCF   No results for input(s): PH, PCO2, PO2 in the last 72 hours. No results for input(s): CPK, CKNDX, TROIQ in the last 72 hours.     No lab exists for component: CPKMB  No results found for: CHOL, CHOLX, CHLST, CHOLV, HDL, HDLP, LDL, LDLC, DLDLP, TGLX, TRIGL, TRIGP, CHHD, CHHDX  No results found for: [facility]  Lab Results   Component Value Date/Time    Color DARK YELLOW 12/06/2019 08:39 PM    Appearance CLEAR 12/06/2019 08:39 PM    Specific gravity 1.020 12/06/2019 08:39 PM    Specific gravity 1.010 01/08/2016 06:38 AM    pH (UA) 6.0 12/06/2019 08:39 PM    Protein TRACE (A) 12/06/2019 08:39 PM    Glucose NEGATIVE  12/06/2019 08:39 PM    Ketone 15 (A) 12/06/2019 08:39 PM    Bilirubin NEGATIVE  01/08/2016 06:38 AM    Urobilinogen 1.0 12/06/2019 08:39 PM    Nitrites NEGATIVE  12/06/2019 08:39 PM    Leukocyte Esterase NEGATIVE  12/06/2019 08:39 PM    Epithelial cells FEW 12/06/2019 08:39 PM    Bacteria NEGATIVE  12/06/2019 08:39 PM    WBC 0-4 12/06/2019 08:39 PM    RBC 0-5 12/06/2019 08:39 PM         Medications Reviewed:     Current Facility-Administered Medications   Medication Dose Route Frequency    ceFAZolin (ANCEF) 2 g/20 mL in sterile water IV syringe  2 g IntraVENous Q8H    sodium chloride (NS) flush 5-40 mL  5-40 mL IntraVENous Q8H    sodium chloride (NS) flush 5-40 mL  5-40 mL IntraVENous PRN    acetaminophen (TYLENOL) tablet 650 mg  650 mg Oral Q4H PRN    heparin (porcine) injection 5,000 Units  5,000 Units SubCUTAneous Q8H    oxyCODONE-acetaminophen (PERCOCET) 5-325 mg per tablet 1 Tab  1 Tab Oral Q4H PRN     ______________________________________________________________________  EXPECTED LENGTH OF STAY: - - -  ACTUAL LENGTH OF STAY:          3                 Celi Khan MD

## 2019-12-11 VITALS
OXYGEN SATURATION: 97 % | DIASTOLIC BLOOD PRESSURE: 90 MMHG | SYSTOLIC BLOOD PRESSURE: 144 MMHG | RESPIRATION RATE: 18 BRPM | TEMPERATURE: 98 F | HEIGHT: 72 IN | WEIGHT: 189.82 LBS | HEART RATE: 72 BPM | BODY MASS INDEX: 25.71 KG/M2

## 2019-12-11 PROCEDURE — 74011250636 HC RX REV CODE- 250/636: Performed by: INTERNAL MEDICINE

## 2019-12-11 PROCEDURE — 74011250637 HC RX REV CODE- 250/637: Performed by: INTERNAL MEDICINE

## 2019-12-11 PROCEDURE — 77030011943

## 2019-12-11 RX ORDER — OXYCODONE AND ACETAMINOPHEN 5; 325 MG/1; MG/1
1 TABLET ORAL
Qty: 10 TAB | Refills: 0 | Status: SHIPPED | OUTPATIENT
Start: 2019-12-11 | End: 2019-12-14

## 2019-12-11 RX ORDER — ACETAMINOPHEN 325 MG/1
650 TABLET ORAL
Qty: 30 TAB | Refills: 0 | Status: SHIPPED
Start: 2019-12-11

## 2019-12-11 RX ORDER — CEPHALEXIN 500 MG/1
500 CAPSULE ORAL 3 TIMES DAILY
Qty: 51 CAP | Refills: 0 | Status: SHIPPED | OUTPATIENT
Start: 2019-12-11 | End: 2019-12-28

## 2019-12-11 RX ADMIN — Medication 10 ML: at 04:22

## 2019-12-11 RX ADMIN — Medication 2 G: at 04:22

## 2019-12-11 RX ADMIN — Medication 2 G: at 08:59

## 2019-12-11 RX ADMIN — OXYCODONE HYDROCHLORIDE AND ACETAMINOPHEN 1 TABLET: 5; 325 TABLET ORAL at 10:17

## 2019-12-11 NOTE — PROGRESS NOTES
Urology Progress Note    Patient: Pretty Vivar MRN: [id number]  SSN: [id number]    YOB: 1970  Age: 52 y.o. Sex: male        ADMITTED: 2019 to Darlys Litten, MD for Pyelonephritis [N12]  POD# * No surgery found *     Percutaneous drain was removed yesterday. He feels well and has no complaints. Afebrile overnight. Vitals: Temp (24hrs), Av.9 °F (36.6 °C), Min:97.6 °F (36.4 °C), Max:98 °F (36.7 °C)                   Blood pressure 123/76, pulse 71, temperature 97.9 °F (36.6 °C), resp. rate 18, height 6' (1.829 m), weight 86.1 kg (189 lb 13.1 oz), SpO2 97 %. Intake and Output:   0701 - 12/10 1900  In: 0445 [P.O.:590; I.V.:450]  Out: 1910 [Urine:1900; Drains:10]            Labs:  Labs:   Lab Results   Component Value Date/Time    WBC 12.0 (H) 2019 06:06 AM    HGB 12.5 2019 06:06 AM    Creatinine 0.82 2019 06:06 AM         Assessment/Plan:  Discharged with antibiotics. Continue outpatient follow-up with Dr. Kitty Gtz.        Signed By: Luna Em MD - 2019

## 2019-12-11 NOTE — DISCHARGE SUMMARY
Discharge Summary       PATIENT ID: Fransisco Samuels  MRN: [id number]   YOB: 1970    DATE OF ADMISSION: 12/6/2019  5:28 PM    DATE OF DISCHARGE: 12/11/19    PRIMARY CARE PROVIDER: Unknown, Provider     ATTENDING PHYSICIAN: Tejal Clay MD  DISCHARGING PROVIDER: Tejal Clay MD    To contact this individual call [phone] and ask the  to page. If unavailable ask to be transferred the Adult Hospitalist Department. CONSULTATIONS: IP CONSULT TO HOSPITALIST  IP CONSULT TO UROLOGY  IP CONSULT TO INFECTIOUS DISEASES    PROCEDURES/SURGERIES: * No surgery found *    ADMITTING DIAGNOSES & HOSPITAL COURSE:   1.  L Pyelonephritis with developing left posterolateral perinephric abscess. Status post CT-guided IR drainage of perinephric abscess on 12/7/2019. Drain removal 12/10/19.  -15 mL pus removed in IR, soft drain catheter in place  - Appreciate IR, Dr. Hue Bush. - Cx s/o MSSA. On IV Ancef. Would need IV abx for 2-3 weeks. - Appreciate ID recommendatoins. Detailed discussion for IV vs PO abx and pt adamantly refusing IV abx and wish to dc on PO abx only. - Keflex 500mg 3times a day for 2-3 weeks per ID recommendations. - Abx started on 12/7/19. Will continue the Abx until 12/28/19. Would given script for remaining days and f/u with PCP and ID.   - ID f/up for 1/7/19 at 230pm noted. - Weekly CBC with Diff, CMP to be faxed to Dr. Kitty Pagan at [phone]  - Urology, Dr. Wilton Tijerina, on board. Repeat CT 12/10 collection resolution- removed drain. F/u with Urology outpatient.     # Neurogenic bladder, self cath dependent  # Tobacco abuse -Patient counseled.   # Leukocytosis, secondary to pyelonephritis -Improving. monitor     Code status: Full code  DVT prophylaxis: Heparin  Care Plan discussed with: Patient/Family and Nurse  Disposition: Home w/Family and TBD        DISCHARGE DIAGNOSES / PLAN:      # Left perinephric abscess, MSSA, on PO Keflex until 12/28/19, s/p CT guided drainage and drain placement on 12/7/19, drain removed 12/10/19  # Neurogenic bladder, self cath dependent     ADDITIONAL CARE RECOMMENDATIONS:   # PCP f/u in 1 week for post hospital discharge  # F/U with Dr. Danielle Thomas, infectious disease on 1/7/19 2.30 pm.  -Address: [address] [facility] [address], [place], [address] [facility].   -Phone: [phone], Fax   -Check weekly CBC wt diff, cmp and fax to Dr. Danielle Thomas at [phone].  -Please get repeat CT scan Abadomen and pelvis without contrast in 2weeks. Results to be faxed to Dr. Danielle Thomas.  # F/U with Dr. Nayla Villa, Urology in 2-3 week for clinical reassessment, wound check at drain site and if any concerns. PENDING TEST RESULTS:   At the time of discharge the following test results are still pending: None    FOLLOW UP APPOINTMENTS:    Follow-up Information     Follow up With Specialties Details Why Contact Info    Unknown, Provider  In 1 week As needed, If symptoms worsen and for post hospital follow up Patient not available to ask      Anila EDMONDS, DO Infectious Diseases In 3 weeks for infectious disease f/u: appointment on 1/7/20 at 230pm. CBC w Diff, [place] [address] weekly [address] [facility]  [address] [place] [address]., MD Urology In 2 weeks As needed, If symptoms worsen, For wound re-check @ drain site [place] [address]. [phone]             DIET: Regular Diet     ACTIVITY: Activity as tolerated      DISCHARGE MEDICATIONS:  Current Discharge Medication List      START taking these medications    Details   acetaminophen (TYLENOL) 325 mg tablet Take 2 Tabs by mouth every four (4) hours as needed for Pain or Fever. Qty: 30 Tab, Refills: 0      oxyCODONE-acetaminophen (PERCOCET) 5-325 mg per tablet Take 1 Tab by mouth every eight (8) hours as needed for Pain for up to 3 days. Max Daily Amount: 3 Tabs.  Indications: pain  Qty: 10 Tab, Refills: 0    Associated Diagnoses: Perinephric abscess      cephALEXin (KEFLEX) 500 mg capsule Take 1 Cap by mouth three (3) times daily for 17 days. Indications: Bacterial Urinary Tract Infection, MSSA perinephric abscess left side  Qty: 51 Cap, Refills: 0         STOP taking these medications       vancomycin HCl (VANCOMYCIN IV) Comments:   Reason for Stopping:         piperacillin-tazobactam (ZOSYN) 3.375 gram injection Comments:   Reason for Stopping:                 NOTIFY YOUR PHYSICIAN FOR ANY OF THE FOLLOWING:   Fever over 101 degrees for 24 hours. Chest pain, shortness of breath, fever, chills, nausea, vomiting, diarrhea, change in mentation, falling, weakness, bleeding. Severe pain or pain not relieved by medications. Or, any other signs or symptoms that you may have questions about. DISPOSITION:  x  Home With:   OT  PT  HH  RN       Long term SNF/Inpatient Rehab    Independent/assisted living    Hospice    Other:       PATIENT CONDITION AT DISCHARGE:     Functional status    Poor     Deconditioned    x Independent      Cognition   x  Lucid     Forgetful     Dementia      Catheters/lines (plus indication)    Singh     PICC     PEG    x None      Code status   x  Full code     DNR      PHYSICAL EXAMINATION AT DISCHARGE:  Constitutional:  No acute distress, cooperative, pleasant          Resp:  CTA bilaterally. No wheezing/rhonchi/rales. No accessory muscle use   CV:  Regular rhythm, normal rate, no murmurs    GI:  Soft, non distended, mild tenderness left upper quadrant and lateral left flank subcostally . Musculoskeletal:  No edema, warm    Neurologic:  Moves all extremities. AAOx3                         Skin:  Good turgor, no rashes or ulcers  :  Tenderness resolved. Drain in left flank removed, site healing well. No leaking. No CVA tenderness  Eyes:  EOMI. Anicteric sclerae, PERRL.       CHRONIC MEDICAL DIAGNOSES:  Problem List as of 12/11/2019 Never Reviewed          Codes Class Noted - Resolved    Pyelonephritis ICD-10-CM: N12  ICD-9-CM: 590.80  12/6/2019 - Present              Greater than 45 minutes were spent with the patient on counseling and coordination of care    Signed:   Garald Primrose, MD  12/11/2019  8:33 AM

## 2019-12-11 NOTE — PROGRESS NOTES
Transition of Care Plan:                    -Ready for discharge  -PCP f/u in 1 week for post hospital discharge  -F/U with Dr. Randy Rodriguez, infectious disease on 1/7/19 2.30 pm.  -[employer] Care Card application received, pending   -Referral sent to [facility] to confirm screened for PAUL[facility]  -Friend will transport at discharge-On the way, 40 minutes away    Reason for Admission:   Severe left plank pain. Patient is alert and oriented. Patient lives alone is independent uses no assisting devices. No home oxygen on room air. Patient transports himself to and from and friend will help at discharge. Patient is uninsured has recently applied for financial assistance with [employer]. CM sent referral to [employer] to confirm patient screened for Medicaid. RRAT Score:     4/low                Plan for utilizing home health:      Not at this time. Current Advanced Directive/Advance Care Plan:  Not on file                         Care Management Interventions  PCP Verified by CM: No  Mode of Transport at Discharge: Other (see comment)(Friend )  Transition of Care Consult (CM Consult):  Other(Home no needs)  Discharge Durable Medical Equipment: No  Physical Therapy Consult: No  Occupational Therapy Consult: No  Speech Therapy Consult: No  Current Support Network: Lives Alone  Confirm Follow Up Transport: Self  Discharge Location  Discharge Placement: Home    CM will follow    Orrie Angelucci, MHA/WILFRED

## 2019-12-11 NOTE — PROGRESS NOTES
[facility] [facility] Adult  Hospitalist Group                                                                                          Hospitalist Progress Note  Carmen Higginbotham MD  Answering service: [phone] from in house phone        Date of Service:  2019  NAME:  Melissa Martel  :  1970  MRN:  [id number]      Admission Summary:   The patient is a 51-year-old male transferred from [facility] with the above-mentioned symptom. The patient reports that he has history of neurogenic bladder, he self-caths himself. About two weeks back, he started noticing some left flank pain. The patient reports the pain got much worse in the last three days and he presented to [facility].  The patient reports he follows up with Dr. Tr López from Urology. The patient was seen over at [facility] [place], had a CT done, there was a concern for a perinephric abscess, and the patient was sent to [facility] ER for further management and evaluation. The patient reports that he has not had any fever or chills, but does admit to some nausea. Denies any vomiting. The patient denies any headache, blurry vision, sore throat, trouble swallowing, trouble with speech, any chest pain, shortness of breath, cough. Does admit to chills, but denies any fever, denies any constipation, diarrhea, focal or generalized neurological weakness, recent travel, sick contacts, falls, injuries, hematemesis, melena, hemoptysis, hematuria, or any concerns or problems. Interval history / Subjective:   Patient feels ok, no overnight events. Does not want IV abx as he lives 3hours away and he can not do this everyday. Wants to take PO meds and would come for f/u in 2-3weeks whenever asked. No other concerns. No drainage from the drain removal site. No pain locally. No nausea/vomiting. Assessment & Plan:     1.  L Pyelonephritis with developing left posterolateral perinephric abscess.   Status post CT-guided IR drainage of perinephric abscess on 12/7/2019. Drain removal 12/10/19.  -15 mL pus removed in IR, soft drain catheter in place  - Appreciate IR, Dr. Sylvia Islas. - Cx s/o MSSA. On IV Ancef. Would need IV abx for 2-3 weeks. - Appreciate ID recommendatoins. Detailed discussion for IV vs PO abx and pt adamantly refusing IV abx and wish to dc on PO abx only. - Keflex 500mg 3times a day for 2-3 weeks per ID recommendations. - Abx started on 12/7/19. Will continue the Abx until 12/28/19. Would given script for remaining days and f/u with PCP and ID.   - ID f/up for 1/7/19 at 230pm noted. - Weekly CBC with Diff, CMP to be faxed to Dr. Juma Rodriguez at [phone]  - Urology, Dr. Maria Victoria Evans, on board. Repeat CT 12/10 collection resolution- removed drain. F/u with Urology outpatient. # Neurogenic bladder, self cath dependent  # Tobacco abuse -Patient counseled. # Leukocytosis, secondary to pyelonephritis -Improving. monitor    Code status: Full code  DVT prophylaxis: Heparin  Care Plan discussed with: Patient/Family and Nurse  Disposition: Home w/Family and TBD     Hospital Problems  Never Reviewed          Codes Class Noted POA    Pyelonephritis ICD-10-CM: N12  ICD-9-CM: 590.80  12/6/2019 Unknown            Review of Systems:   A comprehensive review of systems was negative except for that written in the HPI. Vital Signs:    Last 24hrs VS reviewed since prior progress note. Most recent are:  Visit Vitals  /76 (BP 1 Location: Right arm, BP Patient Position: At rest)   Pulse 71   Temp 97.9 °F (36.6 °C)   Resp 18   Ht 6' (1.829 m)   Wt 86.1 kg (189 lb 13.1 oz)   SpO2 97%   BMI 25.74 kg/m²         Intake/Output Summary (Last 24 hours) at 12/11/2019 0805  Last data filed at 12/11/2019 0045  Gross per 24 hour   Intake    Output 1350 ml   Net -1350 ml        Physical Examination:     Constitutional:  No acute distress, cooperative, pleasant        Resp:  CTA bilaterally. No wheezing/rhonchi/rales.  No accessory muscle use   CV:  Regular rhythm, normal rate, no murmurs    GI:  Soft, non distended, mild tenderness left upper quadrant and lateral left flank subcostally . Musculoskeletal:  No edema, warm    Neurologic:  Moves all extremities. AAOx3     Skin:  Good turgor, no rashes or ulcers  :  Tenderness resolved. Drain in left flank removed, site healing well. No leaking. No CVA tenderness  Eyes:  EOMI. Anicteric sclerae, PERRL. Data Review:   Review and/or order of clinical lab test  Review and/or order of tests in the radiology section of CPT  Review and/or order of tests in the medicine section of CPT      Labs:     Recent Labs     12/09/19 0606   WBC 12.0*   HGB 12.5   HCT 38.3   *     Recent Labs     12/09/19 0606      K 3.4*   *   CO2 26   BUN 3*   CREA 0.82   GLU 98   CA 9.0   MG 2.1     Recent Labs     12/09/19 0606   SGOT 21   ALT 26   AP 70   TBILI 0.3   TP 6.8   ALB 2.6*   GLOB 4.2*     No results for input(s): INR, PTP, APTT, INREXT, INREXT in the last 72 hours. No results for input(s): FE, TIBC, PSAT, FERR in the last 72 hours. No results found for: FOL, RBCF   No results for input(s): PH, PCO2, PO2 in the last 72 hours. No results for input(s): CPK, CKNDX, TROIQ in the last 72 hours.     No lab exists for component: CPKMB  No results found for: CHOL, CHOLX, CHLST, CHOLV, HDL, HDLP, LDL, LDLC, DLDLP, TGLX, TRIGL, TRIGP, CHHD, CHHDX  No results found for: [facility]  Lab Results   Component Value Date/Time    Color DARK YELLOW 12/06/2019 08:39 PM    Appearance CLEAR 12/06/2019 08:39 PM    Specific gravity 1.020 12/06/2019 08:39 PM    Specific gravity 1.010 01/08/2016 06:38 AM    pH (UA) 6.0 12/06/2019 08:39 PM    Protein TRACE (A) 12/06/2019 08:39 PM    Glucose NEGATIVE  12/06/2019 08:39 PM    Ketone 15 (A) 12/06/2019 08:39 PM    Bilirubin NEGATIVE  01/08/2016 06:38 AM    Urobilinogen 1.0 12/06/2019 08:39 PM    Nitrites NEGATIVE  12/06/2019 08:39 PM    Leukocyte Esterase NEGATIVE  12/06/2019 08:39 PM    Epithelial cells FEW 12/06/2019 08:39 PM    Bacteria NEGATIVE  12/06/2019 08:39 PM    WBC 0-4 12/06/2019 08:39 PM    RBC 0-5 12/06/2019 08:39 PM         Medications Reviewed:     Current Facility-Administered Medications   Medication Dose Route Frequency    ceFAZolin (ANCEF) 2 g/20 mL in sterile water IV syringe  2 g IntraVENous Q8H    sodium chloride (NS) flush 5-40 mL  5-40 mL IntraVENous Q8H    sodium chloride (NS) flush 5-40 mL  5-40 mL IntraVENous PRN    acetaminophen (TYLENOL) tablet 650 mg  650 mg Oral Q4H PRN    heparin (porcine) injection 5,000 Units  5,000 Units SubCUTAneous Q8H    oxyCODONE-acetaminophen (PERCOCET) 5-325 mg per tablet 1 Tab  1 Tab Oral Q4H PRN     ______________________________________________________________________  EXPECTED LENGTH OF STAY: 3d 0h  ACTUAL LENGTH OF STAY:          5                 Alfreda Alcala MD

## 2019-12-11 NOTE — DISCHARGE INSTRUCTIONS
Discharge Instructions       PATIENT ID: Sanjeev Carrion  MRN: [id number]   YOB: 1970    DATE OF ADMISSION: 12/6/2019  5:28 PM    DATE OF DISCHARGE: 12/11/2019    PRIMARY CARE PROVIDER: Unknown, Provider     ATTENDING PHYSICIAN: Alisha Ramirez MD  DISCHARGING PROVIDER: Francisca Stanton MD    To contact this individual call [phone] and ask the  to page. If unavailable ask to be transferred the Adult Hospitalist Department.     DISCHARGE DIAGNOSES   Left perinephric abscess, MSSA, on PO Keflex until 12/28/19, s/p CT guided drainage and drain placement on 12/7/19, drain removed 12/10/19  Neurogenic bladder, self cath dependent    CONSULTATIONS: IP CONSULT TO HOSPITALIST  IP CONSULT TO UROLOGY  IP CONSULT TO INFECTIOUS DISEASES    PROCEDURES/SURGERIES: * No surgery found *    PENDING TEST RESULTS:   At the time of discharge the following test results are still pending: None    FOLLOW UP APPOINTMENTS:   Follow-up Information     Follow up With Specialties Details Why Contact Info    Unknown, Provider  In 1 week As needed, If symptoms worsen and for post hospital follow up Patient not available to ask      Ronak EDMONDS DO Infectious Diseases In 3 weeks for infectious disease f/u: appointment on 1/7/20 at 230pm. CBC w Diff, [address] weekly [address] [facility]  [address]      Michael Reagan MD Urology In 2 weeks As needed, If symptoms worsen, For wound re-check @ drain site [address]  [address]  [address] [place] [address]             ADDITIONAL CARE RECOMMENDATIONS:   # PCP f/u in 1 week for post hospital discharge  # F/U with Dr. Jorge Finn, infectious disease on 1/7/19 2.30 pm.  -Address: [facility], [address], [place], [address]   -Phone: [phone], Fax   -Check weekly CBC wt diff, cmp and fax to Dr. Jorge Finn at [phone].  -Please get repeat CT scan Abadomen and pelvis without contrast in 2weeks. Results to be faxed to Dr. Yarelis Sierra.  # F/U with Dr. Tr López, Urology in 2-3 week for clinical reassessment, wound check at drain site and if any concerns. DIET: Regular Diet    ACTIVITY: Activity as tolerated      DISCHARGE MEDICATIONS:   See Medication Reconciliation Form    · It is important that you take the medication exactly as they are prescribed. · Keep your medication in the bottles provided by the pharmacist and keep a list of the medication names, dosages, and times to be taken in your wallet. · Do not take other medications without consulting your doctor. NOTIFY YOUR PHYSICIAN FOR ANY OF THE FOLLOWING:   Fever over 101 degrees for 24 hours. Chest pain, shortness of breath, fever, chills, nausea, vomiting, diarrhea, change in mentation, falling, weakness, bleeding. Severe pain or pain not relieved by medications. Or, any other signs or symptoms that you may have questions about.       DISPOSITION:  x  Home With:   OT  PT  HH  RN       SNF/Inpatient Rehab/LTAC    Independent/assisted living    Hospice    Other:     CDMP Checked:   Yes x     PROBLEM LIST Updated:  Yes x       Signed:   Carmen Higginbotham MD  12/11/2019  8:26 AM

## 2019-12-12 LAB
BACTERIA SPEC CULT: NORMAL
SERVICE CMNT-IMP: NORMAL